# Patient Record
Sex: FEMALE | NOT HISPANIC OR LATINO | Employment: OTHER | URBAN - METROPOLITAN AREA
[De-identification: names, ages, dates, MRNs, and addresses within clinical notes are randomized per-mention and may not be internally consistent; named-entity substitution may affect disease eponyms.]

---

## 2017-02-27 ENCOUNTER — TRANSFERRED RECORDS (OUTPATIENT)
Dept: HEALTH INFORMATION MANAGEMENT | Facility: CLINIC | Age: 67
End: 2017-02-27

## 2017-12-27 NOTE — TELEPHONE ENCOUNTER
APPT INFO    Date /Time: 1/26/18 3:50PM    Reason for Appt: COPD, referred by Dr Peter Wells, pt has outside pulm recs    Ref Provider/Clinic: RUSSELL KERR, [facility]    Are there internal records? Yes/No?  IF YES, list clinic names: No internal recs    Are there outside records? Yes/No? Yes    Patient Contact (Y/N) & Call Details: Yes call was connected from Referrals team (Doni) spoke with pt and mailing out STEFFANY forms to pt   Action: Mailing out STEFFANY forms      OUTSIDE RECORDS CHECKLIST     CLINIC NAME COMMENTS REC (x) IMG (x)   [facility] Russell KERR (referring) / Keerthi KERR ([facility])    Faxed cover sheets to re. recs      [facility] Plasencia MD       [facility] Antonio TAY

## 2017-12-29 NOTE — TELEPHONE ENCOUNTER
Records Received From: [facility]     Date/Exam/Location  (specify location if different)   Office Notes:     [facility] notes: 5/24/17  [facility] notes: 8/11/17, 5/8/17, 9/9/16, 5/18/16   Radiology Reports: - CT chest 6/13/11-[facility]   - xray chest 2/27/17- [facility] radiology  - CT chest pulmonary angio 3/31/16- [facility] radiology    PFT: 4/5/16- [place]

## 2017-12-29 NOTE — TELEPHONE ENCOUNTER
Records Received From: [facility]     Date/Exam/Location  (specify location if different)   Office Notes: 2/27/17, 1/24/17, 4/18/16, 3/30/16, 3/22/16, 10/12/15, 2014, 2013, 2012

## 2018-01-18 NOTE — TELEPHONE ENCOUNTER
ACTION    What did you do? Faxed cover sheets to doc. recs from  Delmis Plasencia MD   [facility] KERR [facility] Dave GREGORY  [facility] Antonio TAY

## 2018-01-19 NOTE — TELEPHONE ENCOUNTER
Note:   Per fax from [employer], no records for patient. Also noted they are not the office of Dr. Yessica Plasencia.

## 2018-01-22 DIAGNOSIS — J44.9 COPD (CHRONIC OBSTRUCTIVE PULMONARY DISEASE) (H): Primary | ICD-10-CM

## 2018-01-25 NOTE — TELEPHONE ENCOUNTER
Phone Call:    Who did you talk to? (or) Who did you call? Called PT    Call Detail/Action: Called and LM for pt - STEFFANY form for [facility] form was not filled completely.

## 2018-01-25 NOTE — TELEPHONE ENCOUNTER
ACTION    What did you do? Faxed cover sheet to [facility] to obtain outside recs  Faxed cover sheet to [facility] to doc. zoraidas

## 2018-01-26 ENCOUNTER — RADIANT APPOINTMENT (OUTPATIENT)
Dept: GENERAL RADIOLOGY | Facility: CLINIC | Age: 68
End: 2018-01-26
Payer: COMMERCIAL

## 2018-01-26 ENCOUNTER — PRE VISIT (OUTPATIENT)
Dept: PULMONOLOGY | Facility: CLINIC | Age: 68
End: 2018-01-26

## 2018-01-26 ENCOUNTER — OFFICE VISIT (OUTPATIENT)
Dept: PULMONOLOGY | Facility: CLINIC | Age: 68
End: 2018-01-26
Attending: INTERNAL MEDICINE
Payer: MEDICARE

## 2018-01-26 VITALS
SYSTOLIC BLOOD PRESSURE: 128 MMHG | RESPIRATION RATE: 16 BRPM | OXYGEN SATURATION: 97 % | DIASTOLIC BLOOD PRESSURE: 83 MMHG | HEART RATE: 78 BPM

## 2018-01-26 DIAGNOSIS — J44.9 COPD (CHRONIC OBSTRUCTIVE PULMONARY DISEASE) (H): ICD-10-CM

## 2018-01-26 DIAGNOSIS — J44.9 CHRONIC OBSTRUCTIVE PULMONARY DISEASE, UNSPECIFIED COPD TYPE (H): Primary | ICD-10-CM

## 2018-01-26 PROCEDURE — G0463 HOSPITAL OUTPT CLINIC VISIT: HCPCS | Mod: ZF

## 2018-01-26 RX ORDER — PREDNISONE 20 MG/1
40 TABLET ORAL DAILY
Qty: 10 TABLET | Refills: 1 | Status: SHIPPED | OUTPATIENT
Start: 2018-01-26 | End: 2018-01-31

## 2018-01-26 RX ORDER — ATORVASTATIN CALCIUM 40 MG/1
40 TABLET, FILM COATED ORAL
COMMUNITY
Start: 2016-07-13 | End: 2024-02-16

## 2018-01-26 RX ORDER — MAGNESIUM GLUCONATE 30 MG(550)
TABLET ORAL
COMMUNITY

## 2018-01-26 RX ORDER — PANTOPRAZOLE SODIUM 40 MG/1
40 TABLET, DELAYED RELEASE ORAL
COMMUNITY
End: 2024-02-16

## 2018-01-26 RX ORDER — PRAMIPEXOLE DIHYDROCHLORIDE 0.12 MG/1
0.12 TABLET ORAL
COMMUNITY

## 2018-01-26 RX ORDER — DOXYCYCLINE HYCLATE 50 MG/1
100 CAPSULE ORAL 2 TIMES DAILY
Qty: 28 CAPSULE | Refills: 1 | Status: SHIPPED | OUTPATIENT
Start: 2018-01-26 | End: 2018-02-02

## 2018-01-26 RX ORDER — FLUTICASONE PROPIONATE 220 UG/1
2 AEROSOL, METERED RESPIRATORY (INHALATION)
COMMUNITY
Start: 2017-08-11 | End: 2024-02-16

## 2018-01-26 RX ORDER — LORATADINE 10 MG/1
10 TABLET ORAL
COMMUNITY

## 2018-01-26 RX ORDER — FLUTICASONE PROPIONATE 50 MCG
2 SPRAY, SUSPENSION (ML) NASAL
COMMUNITY
Start: 2016-09-09 | End: 2024-03-19

## 2018-01-26 RX ORDER — ALBUTEROL SULFATE 90 UG/1
2 AEROSOL, METERED RESPIRATORY (INHALATION)
COMMUNITY
Start: 2017-08-11

## 2018-01-26 RX ORDER — ZOLPIDEM TARTRATE 5 MG/1
5 TABLET ORAL
COMMUNITY

## 2018-01-26 ASSESSMENT — PAIN SCALES - GENERAL: PAINLEVEL: NO PAIN (0)

## 2018-01-26 NOTE — NURSING NOTE
Chief Complaint   Patient presents with     Consult     Patient is fozia seen for consultation of COPD      Sugey Busby CMA at 3:28 PM on 1/26/2018

## 2018-01-26 NOTE — LETTER
1/26/2018       RE: Lubna Bruce  [address]  [place] [address]     Dear Colleague,    Thank you for referring your patient, Lubna Bruce, to the [facility] at [facility]. Please see a copy of my visit note below.    Pulmonary Clinic Initial Visit Note    CC: Shortness of breath      HPI: 67 year old female with hx of NSCLC s/p LLL lobectomy (2005), PFO s/p closure (2005), KIMBERLI (not on CPAP), GERD and mild COPD who is seen as a new patient for shortness of breath.    She previously has been seen by pulmonary at [facility]  (Dr. Pendleton),  last seen August 2017.  She carries a known diagnosis of mild COPD with a FEV1 of 1.91 L (79% of predicted in May 2017) with a normal DLCO (April 2016).  She has a history of non-small cell lung cancer status post left lower lobectomy in 2015, for which she is currently in remission.  At that time of the dx, she was found to have a PFO and she is status post closure in 2005.  She also has a long history of GERD for which she is undergone EGD and manometry testing which showed significant acid reflux symptoms, she is currently on a PPI.  She has been followed by sleep at [facility] for a number of years, but has not been seen recently.  A sleep study from 2013 showed an AHI of 9.0 events per hour consistent with mild obstructive sleep apnea.  She is currently not using CPAP.    Her COPD has been maintained on Flovent and albuterol.  Previously she had been on a LAMA but has not been able to afford it and thus has not used it for unclear amount of time. There is been some concern of vocal cord dysfunction in the past and she had been referred to speech therapy.  She has been seen by ENT twice most recently last summer, with both instances having normal exams.      She notes having 1 or 2 exacerbations per year typically preceded by allergy symptoms.  She typically starts with a sore throat  and will be sick for at least 2-3 weeks.  With previous episode she has received prednisone and antibiotics which she is unclear whether or not have helped.  She has been seen by an allergist recently and there were no clear-cut precipitants to her exacerbations.  She describes her exacerbations as having upper airway wheezing that can be difficult for her to breathe.  She typically does not have a cough.     On evaluation today, she feels quite well.  She denies any recent wheezing or exacerbations.  She notes when she is feeling well she uses her albuterol typically once a week.  She continues to be quite active and goes to the [facility] 3-5 times per week doing water aerobics.  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     Her pulmonary function today reveals mild obstructive ventilatory defect with normal lung volumes and normal diffusion (very similar to her previous pulmonary function tests).  Chest x-ray today reveals no acute pulmonary infiltrates or interstitial changes.       PMH:  COPD: Mild airflow obstruction  Non-small cell lung cancer status post left lower lobectomy 2005  GERD  PFO status post closure 2005   Obstructive sleep apnea (not on CPAP)    Allergies:  No known drug allergy    Social History:  She has never smoked.  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     Social History Main Topics     Smoking status: Not on file     Smokeless tobacco: Not on file     Alcohol use Not on file     Drug use: Not on file     Sexual activity: Not on file     Other Topics Concern     Not on file     Social History Narrative      Medications:  Aspirin   Atorvastatin   Flonase  Flovent 2 puffs twice a day  Loratadine  Magnesium  Protonix   Mirapex  Ambien    Family History:  Mother passed away at 74 years old.  She is a history of alcoholism and emphysema  Father  at 51 years old.  He had a history of alcoholism and coronary artery disease    ROS: Complete 10 point ROS negative unless mentioned in HPI    Physical Exam:  There were no vitals taken for this visit.    General: Sitting in the chair in NAD  HEENT: anicteric, moist mucosa  Neck: no palpable lymphadenopathy, no JVD noted  Chest: CTAB, no wheezing, no upper airway wheeze noted with forced expiration.  No stridor.   Cardiac: RRR no murmurs  Abdomen: Soft, flat, non tender, active BS  Extremities: Trace LE Edema  Neuro: A&Ox3, no focal defecits  Skin: no rash noted    Labs and Radiology:    Chest x-ray (2018): By my review no acute pathology noted, full radiology report to follow    Echocardiogram (2016): LV function is normal with an estimated ejection fraction of 60% with normal regional wall motion.  Amplatzer device in place with no evidence of shunt.  Mild elevated pulmonary pressure estimated at 25.1 mmHg.  This is unchanged from previous echo noted in 2011.    No new laboratory data    PFT's:        Assessment and Plan:  Lubna Bruce is a 67 year old female with hx of NSCLC s/p LLL lobectomy (), PFO s/p closure (), KIMBERLI (not on CPAP), GERD and mild COPD who is seen as a new patient for shortness of breath.    Shortness of breath: Pulmonary function today shows mild COPD.  She has been maintained on Flovent and albuterol with relatively good success.  Based on her previous pulmonary visits at [facility] there has been concern for vocal cord dysfunction.  However she has been evaluated by ENT at least twice with normal exams and therefore has not been referred to speech therapy.  The symptoms he describes certainly could be explained by  vocal cord dysfunction as opposed to her mild obstructive ventilatory defect but she seems reluctant to move forward with speech therapy at this.  It seems reasonable this time to keep her on her current inhaler regimen and prescribe her home action plan.  Probably most importantly she would benefit from seeing her COPD respiratory therapist group who can be available when she has questions.   -Continue Flovent and albuterol  -Prescribed home action plan (prednisone 40 mg ×5 day and doxycycline 200 mg ×7 days)  -Referral to COPD respiratory therapy  -Pulmonary rehab could be discussed in the future although cost is an issue and is deferred at this time.  She remains as active as she has now most likely would not be needed    Return to clinic in 1 year.    Seen and staffed with Dr. Walker.    Attending statement:    The patient was seen and examined by me with the pulmonary fellow, Dr Faustin.  The case was discussed at length.  Vitals, lab results and imaging from our visit were reviewed.  The note written by Dr Faustin above reflects our joint assessment and plan.    Donny Walker MD    Again, thank you for allowing me to participate in the care of your patient.      Sincerely,    Kennedy Faustin MD

## 2018-01-26 NOTE — MR AVS SNAPSHOT
After Visit Summary   1/26/2018    Lubna Bruce    MRN: [id number]           Patient Information     Date Of Birth          1950        Visit Information        Provider Department      1/26/2018 3:50 PM Kennedy Faustin MD [facility]        Today's Diagnoses     Chronic obstructive pulmonary disease, unspecified COPD type (H)    -  1      Care Instructions    1. Will have a home action plan for you in case you need it for exacerbations   2. COPD RT referral   3. Continue with your current inhalers    Return to clinic in 1 year          Follow-ups after your visit        Additional Services     RT Chronic Pulmonary Disease Specialist Consult                 Follow-up notes from your care team     Return in about 1 year (around 1/26/2019).      Who to contact     If you have questions or need follow up information about today's clinic visit or your schedule please contact [facility] directly at [phone].  Normal or non-critical lab and imaging results will be communicated to you by Lamieccohart, letter or phone within 4 business days after the clinic has received the results. If you do not hear from us within 7 days, please contact the clinic through Lamieccohart or phone. If you have a critical or abnormal lab result, we will notify you by phone as soon as possible.  Submit refill requests through [facility] or call your pharmacy and they will forward the refill request to us. Please allow 3 business days for your refill to be completed.          Additional Information About Your Visit        Lamieccohart Information     [employer] gives you secure access to your electronic health record. If you see a primary care provider, you can also send messages to your care team and make appointments. If you have questions, please call your primary care clinic.  If you do not have a primary care provider, please call [phone] and they will  assist you.        Care EveryWhere ID     This is your Care EveryWhere ID. This could be used by other organizations to access your [place] medical records  [id number]        Your Vitals Were     Pulse Respirations Pulse Oximetry             78 16 97%          Blood Pressure from Last 3 Encounters:   01/26/18 128/83    Weight from Last 3 Encounters:   No data found for Wt              We Performed the Following     RT Chronic Pulmonary Disease Specialist Consult          Today's Medication Changes          These changes are accurate as of 1/26/18 11:59 PM.  If you have any questions, ask your nurse or doctor.               Start taking these medicines.        Dose/Directions    doxycycline 50 MG capsule   Commonly known as:  VIBRAMYCIN   Used for:  Chronic obstructive pulmonary disease, unspecified COPD type (H)   Started by:  Kennedy Faustin MD        Dose:  100 mg   Take 2 capsules (100 mg) by mouth 2 times daily for 7 days   Quantity:  28 capsule   Refills:  1       predniSONE 20 MG tablet   Commonly known as:  DELTASONE   Used for:  Chronic obstructive pulmonary disease, unspecified COPD type (H)   Started by:  Kennedy Faustin MD        Dose:  40 mg   Take 2 tablets (40 mg) by mouth daily for 5 days   Quantity:  10 tablet   Refills:  1            Where to get your medicines      These medications were sent [facility] [address] [place] [facility]  [address] [facility] [address]     Phone:  [phone]     doxycycline 50 MG capsule    predniSONE 20 MG tablet                Primary Care Provider Office Phone # Fax #    Peter Wells [phone]       [address] [place]  [facility] [address]        Equal Access to Services     [facility] Center[facility]AR : Hadii jacek low hadasho Sojayson, waaxda luqadaha, qaybta kaalmada travon, noel maldonado. So [facility] [phone].    ATENCIÓN: Si habla español, tiene a abdi disposición servicios  chay de asistencia lingüística. Lissa knight [phone].    We comply with applicable federal civil rights laws and [place] laws. We do not discriminate on the basis of race, color, national origin, age, disability, sex, sexual orientation, or gender identity.            Thank you!     Thank you for choosing [facility]G SCIENCE [facility] HE[facility]  for your care. Our goal is always to provide you with excellent care. Hearing back from our patients is one way we can continue to improve our services. Please take a few minutes to complete the written survey that you may receive in the mail after your visit with us. Thank you!             Your Updated Medication List - Protect others around you: Learn how to safely use, store and throw away your medicines at www.disposemymeds.org.          This list is accurate as of 1/26/18 11:59 PM.  Always use your most recent med list.                   Brand Name Dispense Instructions for use Diagnosis    albuterol 108 (90 BASE) MCG/ACT Inhaler    PROAIR HFA/PROVENTIL HFA/VENTOLIN HFA     Inhale 2 puffs into the lungs        aspirin 81 MG tablet      Take 81 mg by mouth        atorvastatin 40 MG tablet    LIPITOR     Take 40 mg by mouth        doxycycline 50 MG capsule    VIBRAMYCIN    28 capsule    Take 2 capsules (100 mg) by mouth 2 times daily for 7 days    Chronic obstructive pulmonary disease, unspecified COPD type (H)       fluticasone 220 MCG/ACT Inhaler    FLOVENT HFA     Inhale 2 puffs into the lungs        fluticasone 50 MCG/ACT spray    FLONASE     2 sprays        loratadine 10 MG tablet    CLARITIN     Take 10 mg by mouth        pantoprazole 40 MG EC tablet    PROTONIX     Take 40 mg by mouth        Potassium Gluconate 595 (99 K) MG Tabs           pramipexole 0.125 MG tablet    MIRAPEX     Take 0.125 mg by mouth        predniSONE 20 MG tablet    DELTASONE    10 tablet    Take 2 tablets (40 mg) by mouth daily for 5 days    Chronic obstructive pulmonary disease,  unspecified COPD type (H)       zolpidem 5 MG tablet    AMBIEN     Take 5 mg by mouth

## 2018-01-26 NOTE — PATIENT INSTRUCTIONS
1. Will have a home action plan for you in case you need it for exacerbations   2. COPD RT referral   3. Continue with your current inhalers    Return to clinic in 1 year

## 2018-01-26 NOTE — PROGRESS NOTES
Pulmonary Clinic Initial Visit Note    CC: Shortness of breath      HPI: 67 year old female with hx of NSCLC s/p LLL lobectomy (2005), PFO s/p closure (2005), KIMBERLI (not on CPAP), GERD and mild COPD who is seen as a new patient for shortness of breath.    She previously has been seen by pulmonary at [facility]  (Dr. Pendleton),  last seen August 2017.  She carries a known diagnosis of mild COPD with a FEV1 of 1.91 L (79% of predicted in May 2017) with a normal DLCO (April 2016).  She has a history of non-small cell lung cancer status post left lower lobectomy in 2015, for which she is currently in remission.  At that time of the dx, she was found to have a PFO and she is status post closure in 2005.  She also has a long history of GERD for which she is undergone EGD and manometry testing which showed significant acid reflux symptoms, she is currently on a PPI.  She has been followed by sleep at [facility] for a number of years, but has not been seen recently.  A sleep study from 2013 showed an AHI of 9.0 events per hour consistent with mild obstructive sleep apnea.  She is currently not using CPAP.    Her COPD has been maintained on Flovent and albuterol.  Previously she had been on a LAMA but has not been able to afford it and thus has not used it for unclear amount of time. There is been some concern of vocal cord dysfunction in the past and she had been referred to speech therapy.  She has been seen by ENT twice most recently last summer, with both instances having normal exams.      She notes having 1 or 2 exacerbations per year typically preceded by allergy symptoms.  She typically starts with a sore throat and will be sick for at least 2-3 weeks.  With previous episode she has received prednisone and antibiotics which she is unclear whether or not have helped.  She has been seen by an allergist recently and there were no clear-cut precipitants to her exacerbations.  She describes her exacerbations as having  upper airway wheezing that can be difficult for her to breathe.  She typically does not have a cough.     On evaluation today, she feels quite well.  She denies any recent wheezing or exacerbations.  She notes when she is feeling well she uses her albuterol typically once a week.  She continues to be quite active and goes to the [facility] 3-5 times per week doing water aerobics.  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     Her pulmonary function today reveals mild obstructive ventilatory defect with normal lung volumes and normal diffusion (very similar to her previous pulmonary function tests).  Chest x-ray today reveals no acute pulmonary infiltrates or interstitial changes.       PMH:  COPD: Mild airflow obstruction  Non-small cell lung cancer status post left lower lobectomy   GERD  PFO status post closure 2005   Obstructive sleep apnea (not on CPAP)    Allergies:  No known drug allergy    Social History:  She has never smoked.  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     Social History Main Topics     Smoking status: Not on file     Smokeless tobacco: Not on file     Alcohol use Not on file     Drug use: Not on file     Sexual activity: Not on file     Other Topics Concern     Not on file     Social History Narrative     Medications:  Aspirin   Atorvastatin   Flonase  Flovent 2 puffs twice a day  Loratadine  Magnesium  Protonix   Mirapex  Ambien    Family History:  Mother passed away at 74 years old.  She is a history of alcoholism and emphysema  Father  at 51 years old.  He had a history of alcoholism and coronary artery  disease    ROS: Complete 10 point ROS negative unless mentioned in HPI    Physical Exam:  There were no vitals taken for this visit.    General: Sitting in the chair in NAD  HEENT: anicteric, moist mucosa  Neck: no palpable lymphadenopathy, no JVD noted  Chest: CTAB, no wheezing, no upper airway wheeze noted with forced expiration.  No stridor.   Cardiac: RRR no murmurs  Abdomen: Soft, flat, non tender, active BS  Extremities: Trace LE Edema  Neuro: A&Ox3, no focal defecits  Skin: no rash noted    Labs and Radiology:    Chest x-ray (1/26/2018): By my review no acute pathology noted, full radiology report to follow    Echocardiogram (4/1/2016): LV function is normal with an estimated ejection fraction of 60% with normal regional wall motion.  Amplatzer device in place with no evidence of shunt.  Mild elevated pulmonary pressure estimated at 25.1 mmHg.  This is unchanged from previous echo noted in November 2011.    No new laboratory data    PFT's:        Assessment and Plan:  Lubna Bruce is a 67 year old female with hx of NSCLC s/p LLL lobectomy (2005), PFO s/p closure (2005), KIMBERLI (not on CPAP), GERD and mild COPD who is seen as a new patient for shortness of breath.    Shortness of breath: Pulmonary function today shows mild COPD.  She has been maintained on Flovent and albuterol with relatively good success.  Based on her previous pulmonary visits at [facility] there has been concern for vocal cord dysfunction.  However she has been evaluated by ENT at least twice with normal exams and therefore has not been referred to speech therapy.  The symptoms he describes certainly could be explained by vocal cord dysfunction as opposed to her mild obstructive ventilatory defect but she seems reluctant to move forward with speech therapy at this.  It seems reasonable this time to keep her on her current inhaler regimen and prescribe her home action plan.  Probably most importantly she would benefit from seeing  her COPD respiratory therapist group who can be available when she has questions.   -Continue Flovent and albuterol  -Prescribed home action plan (prednisone 40 mg ×5 day and doxycycline 200 mg ×7 days)  -Referral to COPD respiratory therapy  -Pulmonary rehab could be discussed in the future although cost is an issue and is deferred at this time.  She remains as active as she has now most likely would not be needed    Return to clinic in 1 year.    Seen and staffed with Dr. Walker.    Kennedy Faustin MD  Pulmonary and Critical Care Fellow  [phone]     Attending statement:    The patient was seen and examined by me with the pulmonary fellow, Dr Faustin.  The case was discussed at length.  Vitals, lab results and imaging from our visit were reviewed.  The note written by Dr Faustin above reflects our joint assessment and plan.    Donny Walker MD

## 2018-02-01 LAB
DLCOUNC-%PRED-PRE: 85 %
DLCOUNC-PRE: 17.93 ML/MIN/MMHG
DLCOUNC-PRED: 21 ML/MIN/MMHG
ERV-%PRED-PRE: 47 %
ERV-PRE: 0.25 L
ERV-PRED: 0.53 L
EXPTIME-PRE: 8.2 SEC
FEF2575-%PRED-POST: 62 %
FEF2575-%PRED-PRE: 61 %
FEF2575-POST: 1.21 L/SEC
FEF2575-PRE: 1.18 L/SEC
FEF2575-PRED: 1.94 L/SEC
FEFMAX-%PRED-PRE: 70 %
FEFMAX-PRE: 4.12 L/SEC
FEFMAX-PRED: 5.85 L/SEC
FEV1-%PRED-PRE: 87 %
FEV1-PRE: 2.02 L
FEV1FEV6-PRE: 63 %
FEV1FEV6-PRED: 79 %
FEV1FVC-PRE: 62 %
FEV1FVC-PRED: 78 %
FEV1SVC-PRE: 62 %
FEV1SVC-PRED: 72 %
FIFMAX-PRE: 3.7 L/SEC
FRCPLETH-%PRED-PRE: 101 %
FRCPLETH-PRE: 2.82 L
FRCPLETH-PRED: 2.77 L
FVC-%PRED-PRE: 108 %
FVC-PRE: 3.24 L
FVC-PRED: 2.97 L
IC-%PRED-PRE: 111 %
IC-PRE: 2.98 L
IC-PRED: 2.67 L
RVPLETH-%PRED-PRE: 122 %
RVPLETH-PRE: 2.57 L
RVPLETH-PRED: 2.1 L
TLCPLETH-%PRED-PRE: 113 %
TLCPLETH-PRE: 5.8 L
TLCPLETH-PRED: 5.11 L
VA-%PRED-PRE: 94 %
VA-PRE: 4.95 L
VC-%PRED-PRE: 101 %
VC-PRE: 3.23 L
VC-PRED: 3.2 L

## 2018-04-23 ENCOUNTER — THERAPY VISIT (OUTPATIENT)
Dept: PHYSICAL THERAPY | Facility: CLINIC | Age: 68
End: 2018-04-23
Payer: COMMERCIAL

## 2018-04-23 DIAGNOSIS — G89.29 CHRONIC RIGHT SHOULDER PAIN: Primary | ICD-10-CM

## 2018-04-23 DIAGNOSIS — M25.511 CHRONIC RIGHT SHOULDER PAIN: Primary | ICD-10-CM

## 2018-04-23 PROCEDURE — 97161 PT EVAL LOW COMPLEX 20 MIN: CPT | Mod: GP | Performed by: PHYSICAL THERAPIST

## 2018-04-23 PROCEDURE — 97110 THERAPEUTIC EXERCISES: CPT | Mod: GP | Performed by: PHYSICAL THERAPIST

## 2018-04-23 NOTE — PROGRESS NOTES
[facility] Initial Evaluation  Subjective:  Patient is a 67 year old female presenting with rehab right shoulder hpi.   Lubna Bruce is a 67 year old female with a right shoulder condition.  Condition occurred with:  Unknown cause.  Condition occurred: for unknown reasons.  This is a chronic condition  Pt reports noticing R upper arm pain about eight months ago without known cause.   The pain has specifically been more noticeable since Feb 2018 when performing water exercises in the pool at the . Saw MD in April 2018 who recommended PT. .    Patient reports pain:  Upper arm.    Pain is described as aching and is constant and reported as 8/10.  Associated symptoms:  Loss of motion/stiffness and loss of strength. Pain is worse in the A.M..  Symptoms are exacerbated by certain positions and using arm at shoulder level and relieved by rest.  Since onset symptoms are gradually worsening.        General health as reported by patient is good.  Pertinent medical history includes:  Other (COPD).  Medical allergies: yes (CT dye, sensitive to morphine).  Other surgeries include:  Other and cancer surgery (tonsillectomy, tubligation, L hand, hysterectomy and prolapsed balder repair, septal defect repair, lower left lobectomy for cancer, basil cell remvoal).  Current medications:  Other (cholesterol, rsless leg, antihisamine, flovent, albuteraol, potassium fo rleg cramps, acid reflux).  Current occupation is [employer] .    Primary job tasks include:  Repetitive tasks and other (computer work).        Red flags:  None as reported by the patient.                        Objective:  Standing Alignment:    Cervical/Thoracic:  Forward head, cervical lordosis decreased and thoracic kyphosis increased                                         Shoulder Evaluation:  ROM:  AROM:    Flexion:  Left:  WNL    Right:  103    Abduction:  Left: WNL   Right:  82                Flexion/External  Rotation:  Left:  T3    Right:  T2  Extension/Internal Rotation:  Left:  T6    Right:  Central belt liine          Strength:    Flexion: Left:5/5   Pain:    Right: 5/5     Pain:     Abduction:  Left: 5/5  Pain:    Right: 5-/5     Pain:    Internal Rotation:  Left:5/5     Pain:    Right: 5/5     Pain:  External Rotation:   Left:5/5     Pain:   Right:4+/5     Pain:        Elbow Flexion:  Left:5/5     Pain:    Right:5/5     Pain:  Elbow Extension:  Left:5/5     Pain:    Right:5/5     Pain:                                           Elsie Cervical Evaluation    Posture:  Sitting: poor              Test Movements:      RET: During: no effect  Mechanical Response: no effect  Repeat RET: During: no effect  Mechanical Response: IncROM  RET EXT: During: no effect  After: no effect  Mechanical Response: no effect  Repeat RET EXT: During: no effect  After: no effect  Mechanical Response: IncROM                                                                   ROS    Assessment/Plan:    Patient is a 67 year old female with right side shoulder complaints.    Patient has the following significant findings with corresponding treatment plan.                Diagnosis 1:  R shoulder pain, R cervical above elbow derangement  Pain -  manual therapy, self management, education, directional preference exercise and home program  Decreased ROM/flexibility - manual therapy, therapeutic exercise, therapeutic activity and home program  Decreased joint mobility - manual therapy, therapeutic exercise, therapeutic activity and home program  Decreased strength - therapeutic exercise, therapeutic activities and home program  Inflammation - self management/home program  Decreased function - therapeutic activities and home program  Impaired posture - neuro re-education, therapeutic activities and home program    Therapy Evaluation Codes:   1) History comprised of:   Personal factors that impact the plan of care:      None.    Comorbidity factors  that impact the plan of care are:      None.     Medications impacting care: None.  2) Examination of Body Systems comprised of:   Body structures and functions that impact the plan of care:      Cervical spine and Shoulder.   Activity limitations that impact the plan of care are:      Reaching.  3) Clinical presentation characteristics are:   Evolving/Changing.  4) Decision-Making    Moderate complexity using standardized patient assessment instrument and/or measureable assessment of functional outcome.  Cumulative Therapy Evaluation is: Low complexity.    Previous and current functional limitations:  (See Goal Flow Sheet for this information)    Short term and Long term goals: (See Goal Flow Sheet for this information)     Communication ability:  Patient appears to be able to clearly communicate and understand verbal and written communication and follow directions correctly.  Treatment Explanation - The following has been discussed with the patient:   RX ordered/plan of care  Anticipated outcomes  Possible risks and side effects  This patient would benefit from PT intervention to resume normal activities.   Rehab potential is excellent.    Frequency:  2 X week, once daily  Duration:  for 4 weeks  Discharge Plan:  Achieve all LTG.  Independent in home treatment program.  Reach maximal therapeutic benefit.    Please refer to the daily flowsheet for treatment today, total treatment time and time spent performing 1:1 timed codes.

## 2018-04-23 NOTE — LETTER
[facility]  [address]  [facility] [id number] [phone]    2018    Re: Lubna Bruce   :   1950  MRN:  [id number]   REFERRING PHYSICIAN:   Peter Wells    [facility]    Date of Initial Evaluation:  2018  Visits:  Rxs Used: 1  Reason for Referral:  Chronic right shoulder pain    EVALUATION SUMMARY    [facility] Initial Evaluation  Subjective:  Patient is a 67 year old female presenting with rehab right shoulder hpi.   Lubna Bruce is a 67 year old female with a right shoulder condition.  Condition occurred with:  Unknown cause.  Condition occurred: for unknown reasons.  This is a chronic condition  Pt reports noticing R upper arm pain about eight months ago without known cause.   The pain has specifically been more noticeable since 2018 when performing water exercises in the pool at the . Saw MD in 2018 who recommended PT. .    Patient reports pain:  Upper arm.    Pain is described as aching and is constant and reported as 8/10.  Associated symptoms:  Loss of motion/stiffness and loss of strength. Pain is worse in the A.M..  Symptoms are exacerbated by certain positions and using arm at shoulder level and relieved by rest.  Since onset symptoms are gradually worsening.        General health as reported by patient is good.  Pertinent medical history includes:  Other (COPD).  Medical allergies: yes (CT dye, sensitive to morphine).  Other surgeries include:  Other and cancer surgery (tonsillectomy, tubligation, L hand, hysterectomy and prolapsed balder repair, septal defect repair, lower left lobectomy for cancer, basil cell remvoal).  Current medications:  Other (cholesterol, rsless leg, antihisamine, flovent, albuteraol, potassium fo rleg cramps, acid reflux).  Current occupation is [employer] .     Primary job tasks include:  Repetitive tasks and other (computer work).  Red flags:  None as reported by the patient.  Objective:  Standing Alignment:    Cervical/Thoracic:  Forward head, cervical lordosis decreased and thoracic kyphosis increased  Shoulder Evaluation:  ROM:  AROM:    Flexion:  Left:  WNL    Right:  103  Abduction:  Left: WNL   Right:  82      Flexion/External Rotation:  Left:  T3    Right:  T2  Extension/Internal Rotation:  Left:  T6    Right:  Central belt liine    Strength:    Flexion: Left:5/5   Pain:    Right: 5/5     Pain:   Abduction:  Left: 5/5  Pain:    Right: 5-/5     Pain:  Internal Rotation:  Left:5/5     Pain:    Right: 5/5     Pain:  External Rotation:   Left:5/5     Pain:   Right:4+/5     Pain:    Elbow Flexion:  Left:5/5     Pain:    Right:5/5     Pain:  Elbow Extension:  Left:5/5     Pain:    Right:5/5     Pain:  Elsie Cervical Evaluation  Posture:  Sitting: poor  Test Movements:  RET: During: no effect  Mechanical Response: no effect  Repeat RET: During: no effect  Mechanical Response: IncROM  RET EXT: During: no effect  After: no effect  Mechanical Response: no effect  Repeat RET EXT: During: no effect  After: no effect  Mechanical Response: IncROM  Assessment/Plan:    Patient is a 67 year old female with right side shoulder complaints.    Patient has the following significant findings with corresponding treatment plan.                Diagnosis 1:  R shoulder pain, R cervical above elbow derangement  Pain -  manual therapy, self management, education, directional preference exercise and home program  Decreased ROM/flexibility - manual therapy, therapeutic exercise, therapeutic activity and home program  Decreased joint mobility - manual therapy, therapeutic exercise, therapeutic activity and home program  Decreased strength - therapeutic exercise, therapeutic activities and home program  Inflammation - self management/home program  Decreased function - therapeutic activities  and home program  Impaired posture - neuro re-education, therapeutic activities and home program    Therapy Evaluation Codes:   1) History comprised of:   Personal factors that impact the plan of care:      None.    Comorbidity factors that impact the plan of care are:      None.     Medications impacting care: None.  2) Examination of Body Systems comprised of:   Body structures and functions that impact the plan of care:      Cervical spine and Shoulder.   Activity limitations that impact the plan of care are:      Reaching.  3) Clinical presentation characteristics are:   Evolving/Changing.  4) Decision-Making    Moderate complexity using standardized patient assessment instrument and/or measureable assessment of functional outcome.  Cumulative Therapy Evaluation is: Low complexity.    Previous and current functional limitations:  (See Goal Flow Sheet for this information)    Short term and Long term goals: (See Goal Flow Sheet for this information)     Communication ability:  Patient appears to be able to clearly communicate and understand verbal and written communication and follow directions correctly.  Treatment Explanation - The following has been discussed with the patient:   RX ordered/plan of care  Anticipated outcomes  Possible risks and side effects  This patient would benefit from PT intervention to resume normal activities.   Rehab potential is excellent.    Frequency:  2 X week, once daily  Duration:  for 4 weeks  Discharge Plan:  Achieve all LTG.  Independent in home treatment program.  Reach maximal therapeutic benefit.      Thank you for your referral.    INQUIRIES  Therapist: Hossein Liu DPT  [facility] PHYSICAL THERAPY  [address] [place] [address]. [address]  [facility] [address]  Phone: [phone]  Fax: [phone]

## 2018-04-23 NOTE — MR AVS SNAPSHOT
After Visit Summary   4/23/2018    Lubna Bruce    MRN: [id number]           Patient Information     Date Of Birth          1950        Visit Information        Provider Department      4/23/2018 8:50 AM Hossein Liu, PT [facility] Physical [facility]        Today's Diagnoses     Chronic right shoulder pain    -  1       Follow-ups after your visit        Your next 10 appointments already scheduled     Apr 26, 2018  5:00 PM CDT   MARGARITA Extremity with Hossein Liu, PT   [facility] Physical Therapy ([facility])    [address]  [facility] [address]   [phone]            May 01, 2018  8:30 AM CDT   MARGARITA Extremity with Hossein Liu PT   [facility] Physical Therapy ([facility])    [address]  [facility] [address]   [phone]            May 04, 2018  8:20 AM CDT   MARGARITA Extremity with Cathie Stoddard, PT   [facility] Physical Therapy ([facility])    [address]  [facility] [address]   [phone]              Who to contact     If you have questions or need follow up information about today's clinic visit or your schedule please contact [facility] directly at [phone].  Normal or non-critical lab and imaging results will be communicated to you by MyChart, letter or phone within 4 business days after the clinic has received the results. If you do not hear from us within 7 days, please contact the clinic through MyChart or phone. If you have a critical or abnormal lab result, we will notify you by phone as soon as possible.  Submit refill requests through [employer] or call your pharmacy and they will forward the refill request to us. Please allow 3 business days for your refill to be completed.          Additional  Information About Your Visit        [employer] Information     [employer] gives you secure access to your electronic health record. If you see a primary care provider, you can also send messages to your care team and make appointments. If you have questions, please call your primary care clinic.  If you do not have a primary care provider, please call [phone] and they will assist you.        Care EveryWhere ID     This is your Care EveryWhere ID. This could be used by other organizations to access your [place] medical records  [id number]         Blood Pressure from Last 3 Encounters:   01/26/18 128/83    Weight from Last 3 Encounters:   No data found for Wt              We Performed the Following     HC PT EVAL, LOW COMPLEXITY     MARGARITA INITIAL EVAL REPORT     THERAPEUTIC EXERCISES        Primary Care Provider Office Phone # Fax #    Peter Wells [phone]       [address] [place]  [facility] [address]        Equal Access to Services     [facility]: Hadii aad ku hadasho Soomaali, waaxda luqadaha, qaybta kaalmada adeegyada, waxay idiin hayaan ademichelle ryan . So [facility] [phone].    ATENCIÓN: Si habla español, tiene a abdi disposición servicios gratuitos de asistencia lingüística. Llame al [phone].    We comply with applicable federal civil rights laws and [place] laws. We do not discriminate on the basis of race, color, national origin, age, disability, sex, sexual orientation, or gender identity.            Thank you!     Thank you for choosing [facility] PHYSICAL THERAPY  for your care. Our goal is always to provide you with excellent care. Hearing back from our patients is one way we can continue to improve our services. Please take a few minutes to complete the written survey that you may receive in the mail after your visit with us. Thank you!             Your Updated Medication List - Protect others around you: Learn  how to safely use, store and throw away your medicines at www.disposemymeds.org.          This list is accurate as of 4/23/18  1:47 PM.  Always use your most recent med list.                   Brand Name Dispense Instructions for use Diagnosis    albuterol 108 (90 Base) MCG/ACT Inhaler    PROAIR HFA/PROVENTIL HFA/VENTOLIN HFA     Inhale 2 puffs into the lungs        aspirin 81 MG tablet      Take 81 mg by mouth        atorvastatin 40 MG tablet    LIPITOR     Take 40 mg by mouth        fluticasone 220 MCG/ACT Inhaler    FLOVENT HFA     Inhale 2 puffs into the lungs        fluticasone 50 MCG/ACT spray    FLONASE     2 sprays        loratadine 10 MG tablet    CLARITIN     Take 10 mg by mouth        pantoprazole 40 MG EC tablet    PROTONIX     Take 40 mg by mouth        Potassium Gluconate 595 (99 K) MG Tabs           pramipexole 0.125 MG tablet    MIRAPEX     Take 0.125 mg by mouth        zolpidem 5 MG tablet    AMBIEN     Take 5 mg by mouth

## 2018-04-26 ENCOUNTER — THERAPY VISIT (OUTPATIENT)
Dept: PHYSICAL THERAPY | Facility: CLINIC | Age: 68
End: 2018-04-26
Payer: COMMERCIAL

## 2018-04-26 DIAGNOSIS — G89.29 CHRONIC RIGHT SHOULDER PAIN: ICD-10-CM

## 2018-04-26 DIAGNOSIS — M25.511 CHRONIC RIGHT SHOULDER PAIN: ICD-10-CM

## 2018-04-26 PROCEDURE — 97140 MANUAL THERAPY 1/> REGIONS: CPT | Mod: GP | Performed by: PHYSICAL THERAPIST

## 2018-04-26 PROCEDURE — 97110 THERAPEUTIC EXERCISES: CPT | Mod: GP | Performed by: PHYSICAL THERAPIST

## 2018-05-01 ENCOUNTER — THERAPY VISIT (OUTPATIENT)
Dept: PHYSICAL THERAPY | Facility: CLINIC | Age: 68
End: 2018-05-01
Payer: COMMERCIAL

## 2018-05-01 DIAGNOSIS — G89.29 CHRONIC RIGHT SHOULDER PAIN: ICD-10-CM

## 2018-05-01 DIAGNOSIS — M25.511 CHRONIC RIGHT SHOULDER PAIN: ICD-10-CM

## 2018-05-01 PROCEDURE — 97110 THERAPEUTIC EXERCISES: CPT | Mod: GP | Performed by: PHYSICAL THERAPIST

## 2018-05-01 NOTE — MR AVS SNAPSHOT
After Visit Summary   5/1/2018    Lubna Bruce    MRN: [id number]           Patient Information     Date Of Birth          1950        Visit Information        Provider Department      5/1/2018 8:30 AM Hossein Liu PT [facility] Physical Therapy        Today's Diagnoses     Chronic right shoulder pain           Follow-ups after your visit        Your next 10 appointments already scheduled     May 08, 2018  8:30 AM CDT   MARGARITA Extremity with Hossien Liu PT   [facility] Physical Therapy ([facility])    [address]  [facility] [address] [phone]              Who to contact     If you have questions or need follow up information about today's clinic visit or your schedule please contact [facility] PHYSICAL [facility] directly at [phone].  Normal or non-critical lab and imaging results will be communicated to you by [employer], letter or phone within 4 business days after the clinic has received the results. If you do not hear from us within 7 days, please contact the clinic through [employer] or phone. If you have a critical or abnormal lab result, we will notify you by phone as soon as possible.  Submit refill requests through [employer] or call your pharmacy and they will forward the refill request to us. Please allow 3 business days for your refill to be completed.          Additional Information About Your Visit        MyChart Information     [employer] gives you secure access to your electronic health record. If you see a primary care provider, you can also send messages to your care team and make appointments. If you have questions, please call your primary care clinic.  If you do not have a primary care provider, please call [phone] and they will assist you.        Care EveryWhere ID     This is your Care EveryWhere ID. This could be used by other  organizations to access your [place] medical records  [id number]         Blood Pressure from Last 3 Encounters:   01/26/18 128/83    Weight from Last 3 Encounters:   No data found for Wt              We Performed the Following     THERAPEUTIC EXERCISES        Primary Care Provider Office Phone # Fax #    Peter Wells [phone]       [address] [place]  [facility] [address]        Equal Access to Services     [facility] : Hadii aad ku hadasho Soomaali, waaxda luqadaha, qaybta kaalmada adeegyada, waxay idiin hayaan adeeg kharash la'aan ah. So [facility] [phone].    ATENCIÓN: Si habla espsandra, tiene a abdi disposición servicios gratuitos de asistencia lingüística. [facility] [phone].    We comply with applicable federal civil rights laws and [place] laws. We do not discriminate on the basis of race, color, national origin, age, disability, sex, sexual orientation, or gender identity.            Thank you!     Thank you for choosing [facility]  for your care. Our goal is always to provide you with excellent care. Hearing back from our patients is one way we can continue to improve our services. Please take a few minutes to complete the written survey that you may receive in the mail after your visit with us. Thank you!             Your Updated Medication List - Protect others around you: Learn how to safely use, store and throw away your medicines at www.disposemymeds.org.          This list is accurate as of 5/1/18  9:04 AM.  Always use your most recent med list.                   Brand Name Dispense Instructions for use Diagnosis    albuterol 108 (90 Base) MCG/ACT Inhaler    PROAIR HFA/PROVENTIL HFA/VENTOLIN HFA     Inhale 2 puffs into the lungs        aspirin 81 MG tablet      Take 81 mg by mouth        atorvastatin 40 MG tablet    LIPITOR     Take 40 mg by mouth        fluticasone 220 MCG/ACT Inhaler    FLOVENT HFA      Inhale 2 puffs into the lungs        fluticasone 50 MCG/ACT spray    FLONASE     2 sprays        loratadine 10 MG tablet    CLARITIN     Take 10 mg by mouth        pantoprazole 40 MG EC tablet    PROTONIX     Take 40 mg by mouth        Potassium Gluconate 595 (99 K) MG Tabs           pramipexole 0.125 MG tablet    MIRAPEX     Take 0.125 mg by mouth        zolpidem 5 MG tablet    AMBIEN     Take 5 mg by mouth

## 2018-05-08 ENCOUNTER — THERAPY VISIT (OUTPATIENT)
Dept: PHYSICAL THERAPY | Facility: CLINIC | Age: 68
End: 2018-05-08
Payer: COMMERCIAL

## 2018-05-08 DIAGNOSIS — G89.29 CHRONIC RIGHT SHOULDER PAIN: ICD-10-CM

## 2018-05-08 DIAGNOSIS — M25.511 CHRONIC RIGHT SHOULDER PAIN: ICD-10-CM

## 2018-05-08 PROCEDURE — 97110 THERAPEUTIC EXERCISES: CPT | Mod: GP | Performed by: PHYSICAL THERAPIST

## 2018-05-08 NOTE — PROGRESS NOTES
Subjective:  HPI                    Objective:  System    Physical Exam    General     ROS    Assessment/Plan:    SUBJECTIVE  Subjective changes as noted by pt:  Performing cervical extension 4x/day.  Performing the wand exercise 2x/day. Reaching behind the back is the most difficult.      Current pain level: 6/10     Changes in function:  None     Adverse reaction to treatment or activity:  None    OBJECTIVE  Changes in objective findings:  Mod loss R shoulder ext/IR with pain.  See physical exam section and/or daily flowsheet for response to repeated movements.             ASSESSMENT  Lubna continues to require intervention to meet STG and LTG's: PT  Patient is progressing as expected.  Response to therapy has shown an improvement in  pain level and ROM   Progress made towards STG/LTG? None    PLAN  Continue current treatment plan until patient demonstrates readiness to progress to higher level exercises.    PTA/ATC plan:  N/A    Please refer to the daily flowsheet for treatment today, total treatment time and time spent performing 1:1 timed codes.

## 2018-05-08 NOTE — MR AVS SNAPSHOT
After Visit Summary   5/8/2018    Lubna Bruce    MRN: [id number]           Patient Information     Date Of Birth          1950        Visit Information        Provider Department      5/8/2018 8:30 AM Hossein Liu PT [facility] Physical Therapy        Today's Diagnoses     Chronic right shoulder pain           Follow-ups after your visit        Your next 10 appointments already scheduled     May 14, 2018  7:30 AM CDT   MARGARITA Extremity with Hossein Liu PT   [facility] Physical Therapy ([facility])    [address]  [facility] [address] [phone]              Who to contact     If you have questions or need follow up information about today's clinic visit or your schedule please contact [facility] PHYSICAL [facility] directly at [phone].  Normal or non-critical lab and imaging results will be communicated to you by Eventiozhart, letter or phone within 4 business days after the clinic has received the results. If you do not hear from us within 7 days, please contact the clinic through Eventiozhart or phone. If you have a critical or abnormal lab result, we will notify you by phone as soon as possible.  Submit refill requests through [employer] or call your pharmacy and they will forward the refill request to us. Please allow 3 business days for your refill to be completed.          Additional Information About Your Visit        MyChart Information     [employer] gives you secure access to your electronic health record. If you see a primary care provider, you can also send messages to your care team and make appointments. If you have questions, please call your primary care clinic.  If you do not have a primary care provider, please call [phone] and they will assist you.        Care EveryWhere ID     This is your Care EveryWhere ID. This could be used by other  organizations to access your [place] medical records  [id number]         Blood Pressure from Last 3 Encounters:   01/26/18 128/83    Weight from Last 3 Encounters:   No data found for Wt              We Performed the Following     THERAPEUTIC EXERCISES        Primary Care Provider Office Phone # Fax #    Peter Wells [phone]       [address] [place]  [facility] [address]        Equal Access to Services     NORAH BHATTI : Hadii aad ku hadasho Soomaali, waaxda luqadaha, qaybta kaalmada adeegyada, waxay idiin hayaan adeeg kharash la'aan ah. So [facility] [phone].    ATENCIÓN: Si habla espsandra, tiene a abdi disposición servicios gratuitos de asistencia lingüística. [facility] [phone].    We comply with applicable federal civil rights laws and [place] laws. We do not discriminate on the basis of race, color, national origin, age, disability, sex, sexual orientation, or gender identity.            Thank you!     Thank you for choosing [place] [facility] PHYSICAL THERAPY  for your care. Our goal is always to provide you with excellent care. Hearing back from our patients is one way we can continue to improve our services. Please take a few minutes to complete the written survey that you may receive in the mail after your visit with us. Thank you!             Your Updated Medication List - Protect others around you: Learn how to safely use, store and throw away your medicines at www.disposemymeds.org.          This list is accurate as of 5/8/18  9:07 AM.  Always use your most recent med list.                   Brand Name Dispense Instructions for use Diagnosis    albuterol 108 (90 Base) MCG/ACT Inhaler    PROAIR HFA/PROVENTIL HFA/VENTOLIN HFA     Inhale 2 puffs into the lungs        aspirin 81 MG tablet      Take 81 mg by mouth        atorvastatin 40 MG tablet    LIPITOR     Take 40 mg by mouth        fluticasone 220 MCG/ACT Inhaler    FLOVENT HFA      Inhale 2 puffs into the lungs        fluticasone 50 MCG/ACT spray    FLONASE     2 sprays        loratadine 10 MG tablet    CLARITIN     Take 10 mg by mouth        pantoprazole 40 MG EC tablet    PROTONIX     Take 40 mg by mouth        Potassium Gluconate 595 (99 K) MG Tabs           pramipexole 0.125 MG tablet    MIRAPEX     Take 0.125 mg by mouth        zolpidem 5 MG tablet    AMBIEN     Take 5 mg by mouth

## 2018-05-14 ENCOUNTER — THERAPY VISIT (OUTPATIENT)
Dept: PHYSICAL THERAPY | Facility: CLINIC | Age: 68
End: 2018-05-14
Payer: COMMERCIAL

## 2018-05-14 DIAGNOSIS — M25.511 CHRONIC RIGHT SHOULDER PAIN: ICD-10-CM

## 2018-05-14 DIAGNOSIS — G89.29 CHRONIC RIGHT SHOULDER PAIN: ICD-10-CM

## 2018-05-14 PROCEDURE — 97140 MANUAL THERAPY 1/> REGIONS: CPT | Mod: GP | Performed by: PHYSICAL THERAPIST

## 2018-05-14 PROCEDURE — 97110 THERAPEUTIC EXERCISES: CPT | Mod: GP | Performed by: PHYSICAL THERAPIST

## 2018-05-14 NOTE — PROGRESS NOTES
Subjective:  HPI                    Objective:  System    Physical Exam    General     ROS    Assessment/Plan:    SUBJECTIVE  Subjective changes as noted by pt:  Still trouble reaching up behind her back. Pain when lying on R side, but feels the sidelying posterior capsule stretch has overall been helpful since last session last week.      Current pain level: 3/10     Changes in function:  Yes (See Goal flowsheet attached for changes in current functional level)     Adverse reaction to treatment or activity:  None    OBJECTIVE  Changes in objective findings:  Yes, min loss R shoulder ext/IR.          ASSESSMENT  Lubna continues to require intervention to meet STG and LTG's: PT  Patient is progressing as expected.  Response to therapy has shown an improvement in  pain level, ROM  and function  Progress made towards STG/LTG?  Yes (See Goal flowsheet attached for updates on achievement of STG and LTG)    PLAN  Continue current treatment plan until patient demonstrates readiness to progress to higher level exercises.    PTA/ATC plan:  N/A    Please refer to the daily flowsheet for treatment today, total treatment time and time spent performing 1:1 timed codes.

## 2018-05-14 NOTE — MR AVS SNAPSHOT
After Visit Summary   5/14/2018    Lubna Bruce    MRN: [id number]           Patient Information     Date Of Birth          1950        Visit Information        Provider Department      5/14/2018 7:30 AM [facility]        Today's Diagnoses     Chronic right shoulder pain           Follow-ups after your visit        Who to contact     If you have questions or need follow up information about today's clinic visit or your schedule please contact [facility] directly at [phone].  Normal or non-critical lab and imaging results will be communicated to you by Morning Techart, letter or phone within 4 business days after the clinic has received the results. If you do not hear from us within 7 days, please contact the clinic through Morning Techart or phone. If you have a critical or abnormal lab result, we will notify you by phone as soon as possible.  Submit refill requests through [employer] or call your pharmacy and they will forward the refill request to us. Please allow 3 business days for your refill to be completed.          Additional Information About Your Visit        MyChart Information     [employer] gives you secure access to your electronic health record. If you see a primary care provider, you can also send messages to your care team and make appointments. If you have questions, please call your primary care clinic.  If you do not have a primary care provider, please call [phone] and they will assist you.        Care EveryWhere ID     This is your Care EveryWhere ID. This could be used by other organizations to access your [place] medical records  [id number]         Blood Pressure from Last 3 Encounters:   01/26/18 128/83    Weight from Last 3 Encounters:   No data found for Wt              We Performed the Following     MANUAL THER TECH,1+REGIONS,EA 15 MIN      THERAPEUTIC EXERCISES        Primary Care Provider Office Phone # Fax #    Peter Wells [phone]       [address]   [place] [facility] [address]        Equal Access to Services     JEANCARLOS BHATTI : Hadii jacek ku hadtreo Sodevenali, waaxda luqadaha, qaybta kaalmada ademargot, noel panchal laRiannajeyson maldonado. So [facility] [phone].    ATENCIÓN: Si habla español, tiene a abdi disposición servicios gratuitos de asistencia lingüística. [facility] [phone].    We comply with applicable federal civil rights laws and [place] laws. We do not discriminate on the basis of race, color, national origin, age, disability, sex, sexual orientation, or gender identity.            Thank you!     Thank you for choosing [place] [facility] PHYSICAL THERAPY  for your care. Our goal is always to provide you with excellent care. Hearing back from our patients is one way we can continue to improve our services. Please take a few minutes to complete the written survey that you may receive in the mail after your visit with us. Thank you!             Your Updated Medication List - Protect others around you: Learn how to safely use, store and throw away your medicines at www.disposemymeds.org.          This list is accurate as of 5/14/18  8:11 AM.  Always use your most recent med list.                   Brand Name Dispense Instructions for use Diagnosis    albuterol 108 (90 Base) MCG/ACT Inhaler    PROAIR HFA/PROVENTIL HFA/VENTOLIN HFA     Inhale 2 puffs into the lungs        aspirin 81 MG tablet      Take 81 mg by mouth        atorvastatin 40 MG tablet    LIPITOR     Take 40 mg by mouth        fluticasone 220 MCG/ACT Inhaler    FLOVENT HFA     Inhale 2 puffs into the lungs        fluticasone 50 MCG/ACT spray    FLONASE     2 sprays        loratadine 10 MG tablet    CLARITIN     Take 10 mg by mouth        pantoprazole 40 MG EC tablet    PROTONIX     Take 40 mg by mouth         Potassium Gluconate 595 (99 K) MG Tabs           pramipexole 0.125 MG tablet    MIRAPEX     Take 0.125 mg by mouth        zolpidem 5 MG tablet    AMBIEN     Take 5 mg by mouth

## 2019-04-29 ENCOUNTER — TRANSFERRED RECORDS (OUTPATIENT)
Dept: PHYSICAL THERAPY | Facility: CLINIC | Age: 69
End: 2019-04-29

## 2019-04-29 ENCOUNTER — THERAPY VISIT (OUTPATIENT)
Dept: PHYSICAL THERAPY | Facility: CLINIC | Age: 69
End: 2019-04-29
Payer: COMMERCIAL

## 2019-04-29 DIAGNOSIS — M54.42 ACUTE LEFT-SIDED LOW BACK PAIN WITH LEFT-SIDED SCIATICA: ICD-10-CM

## 2019-04-29 PROCEDURE — 97110 THERAPEUTIC EXERCISES: CPT | Mod: GP | Performed by: PHYSICAL THERAPIST

## 2019-04-29 PROCEDURE — 97161 PT EVAL LOW COMPLEX 20 MIN: CPT | Mod: GP | Performed by: PHYSICAL THERAPIST

## 2019-04-29 PROCEDURE — 97530 THERAPEUTIC ACTIVITIES: CPT | Mod: GP | Performed by: PHYSICAL THERAPIST

## 2019-04-29 NOTE — PROGRESS NOTES
Physical Therapy Initial Examination/Evaluation  April 29, 2019    Lubna Bruce is a 68 year old  female referred to physical therapy by Dr. Peter Wells for treatment of L sided LBP with left-sided sciatica with Precautions/Restrictions/MD instructions evaluate and treat    Therapist Assessment:   1) does not tolerate prolonged postures >2 min  2) mild positive response to prone on elbow 10 x10 sec  3) mild R lateral shift in standing and WB on R in sitting  4) positive myotome/dermatome impairments      Subjective:  The week before 4/15 she had cleaned out the garage and did a bunch of yard work with no problems and then on 4/15 was finishing the remainder of the [employer] furniture move and was carrying a couple loads of clothes that were 20-30 lbs - no immediate incident but was unable to walk later that day from severe L leg pain, primarily in the glute  Chiropractor on 4/15 after initial injury - gave her prednisone (7 day pack) and muscle relaxant    Worst pain is in the glute, leg pain down to the toes at night  Walking can often produce sharp sensations in thigh that can feel like her leg is giving out  Drove to [place] the Thursday following the injury which she thinks exacerbated the injury as she was in a lot of pain when she would get out of the car    *Re-adjusted positions every 1-2 minutes throughout eval*    DOI/onset: 4/15/19 Mechanism of injury: increased heavy workload with yard/patio work  Previous Episodes: none Year of first episode: NA  Recent or major surgery: none History of accidents: none Previous treatment: Advil every four hours, heat   Imaging: none  Present Symptoms: dull 6/10 ache in L glute Improving/unchanging/worsening: staying the same, except unable to sleep since last Wednesday  Symptoms better/worse with: bending: worse, sitting worse >2 min, rising from sitting ok, standing worse >2 min, walking worse >2 min, lying worse on side/back, time of day: worst in the  morning  Pain: rest 6 /10, activity 9/10 at night  Sleeping: interrupted; Sleeping posture: side sleeper on L to avoid R shoulder pain  Chief Complaint: constantly switching positions due to pain making it unable to tolerate her job as a , not currently enjoying what she used to enjoy (yardwork, cooking, household chores)  Social history: many grand kids   Occupation: Semi-retired teacher  Job duties:  Driving, lifting/carrying    Current HEP/exercise regimen: walking for exercise - currently not tolerating walking >2 min  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     Other pertinent PMH/Red Flags: lung cancer 2005 followed by partial lung resection, TIAs due to hole in the heart, emphysema following lung resection, sleep disorder, overweight, pain at night General health as reported by patient: good  Return to MD:  PRN     Objective:    Dynamic Movement Screen  Single leg stance observations: Not assessed  Double limb squat observations: Anterior knee translation, Narrow GIGI and impaired due to increased pain  Single limb squat observations: Not assessed  Gait: Lateral trunk lean R, Compensated Trandelenburg L and Decreased trunk rotation BL    Sitting posture: mildly flexed and R trunk lean Response to corrected sitting posture: increased pain  Standing posture: mildly flexed and R trunk lean Lateral shift: mild right  Movement Loss   Major Moderate Minimal Nil Pain   Flexion   X  X worse in    Extension    X    Side Gliding R    X    Side Gliding L    X      Test Movements  During: Produces, abolishes, increases, decreases, nil, centralising, peripheralising  After: Better, worse, no better, no worse, no effect, centralised, peripheralised     Symptoms During Testing Symptoms After Testing   Pretest symptoms standing     FIS worse peripheralizes   Rep FIS     EIS Better No better/worse   Rep EIS better No better/worse   Pretest symptoms lying     GRISELDA     Rep GRISELDA     EIL better No  better/worse   Rep EIL better centralizes     Hip Joint Screen NA    Myotomes   MMT L2- Hip Flexion L3/4- Knee Extension L4/5- DF L5- Great Toe Ext S1- PF Knee Flexion   Left 4/5 5-/5 4+/5      Right 4-/5   Produces L sided pain 5/5 5/5        Dermatomes   Light Touch L2- medial thigh L3- medial knee L4- medial ankle L5- dormsum of foot S1- lateral ankle S2- posteriormedial thigh   Left WNL hyper WNL WNL WNL WNL   Right             Palpation:  Mild tenderness to palpation at L sided erector spinae ~L3-4    Assessment/Plan:  Patient is a 68 year old female with lumbar complaints.    Patient has the following significant findings with corresponding treatment plan.                Diagnosis 1:  L-sided lumbar radiculopathy consistent with disc protrusion  Pain -  hot/cold therapy, US, electric stimulation, mechanical traction, manual therapy, STS, splint/taping/bracing/orthotics, self management, education, directional preference exercise and home program  Decreased ROM/flexibility - manual therapy, therapeutic exercise, therapeutic activity and home program  Decreased joint mobility - manual therapy, therapeutic exercise, therapeutic activity and home program  Decreased strength - therapeutic exercise, therapeutic activities and home program  Impaired gait - gait training and home program  Impaired muscle performance - electric stimulation, neuro re-education and home program  Decreased function - therapeutic activities and home program  Impaired posture - neuro re-education, therapeutic activities and home program    Therapy Evaluation Codes:     Cumulative Therapy Evaluation is: Low complexity.    Previous and current functional limitations:  (See Goal Flow Sheet for this information)    Short term and Long term goals: (See Goal Flow Sheet for this information)     Communication ability:  Patient appears to be able to clearly communicate and understand verbal and written communication and follow directions  correctly.  Treatment Explanation - The following has been discussed with the patient:   RX ordered/plan of care  Anticipated outcomes  Possible risks and side effects  This patient would benefit from PT intervention to resume normal activities.   Rehab potential is good.    Frequency:  1 X week, once daily  Duration:  for 6 weeks  Discharge Plan:  Achieve all LTG.  Independent in home treatment program.  Reach maximal therapeutic benefit.    Please refer to the daily flowsheet for treatment today, total treatment time and time spent performing 1:1 timed codes.

## 2019-04-29 NOTE — LETTER
[facility] ATHLETIC [facility] PHYSICAL THERAPY  [address]  [place] [id number] [phone]    2019    Re: Lubna Bruce   :   1950  MRN:  [id number]   REFERRING PHYSICIAN:   Peter Wells    [facility] ATHLET[facility] PHYSICAL [facility]    Date of Initial Evaluation:  2019  Visits:  Rxs Used: 1  Reason for Referral:  Acute left-sided low back pain with left-sided sciatica    EVALUATION SUMMARY    Physical Therapy Initial Examination/Evaluation  2019    Lubna Bruce is a 68 year old  female referred to physical therapy by Dr. Peter Wells for treatment of L sided LBP with left-sided sciatica with Precautions/Restrictions/MD instructions evaluate and treat    Therapist Assessment:   1) does not tolerate prolonged postures >2 min  2) mild positive response to prone on elbow 10 x10 sec  3) mild R lateral shift in standing and WB on R in sitting  4) positive myotome/dermatome impairments      Subjective:  The week before 4/15 she had cleaned out the garage and did a bunch of yard work with no problems and then on 4/15 was finishing the remainder of the [employer] furniture move and was carrying a couple loads of clothes that were 20-30 lbs - no immediate incident but was unable to walk later that day from severe L leg pain, primarily in the glute  Chiropractor on 4/15 after initial injury - gave her prednisone (7 day pack) and muscle relaxant    Worst pain is in the glute, leg pain down to the toes at night  Walking can often produce sharp sensations in thigh that can feel like her leg is giving out  Drove to [place] the Thursday following the injury which she thinks exacerbated the injury as she was in a lot of pain when she would get out of the car    *Re-adjusted positions every 1-2 minutes throughout eval*    DOI/onset: 4/15/19 Mechanism of injury: increased heavy workload with yard/patio work  Previous  Episodes: none Year of first episode: NA  Recent or major surgery: none History of accidents: none Previous treatment:  Re: Lubna Bruce   :   1950         Advil every four hours, heat   Imaging: none  Present Symptoms: dull 6/10 ache in L glute Improving/unchanging/worsening: staying the same, except unable to sleep since last Wednesday  Symptoms better/worse with: bending: worse, sitting worse >2 min, rising from sitting ok, standing worse >2 min, walking worse >2 min, lying worse on side/back, time of day: worst in the morning  Pain: rest 6 /10, activity 9/10 at night  Sleeping: interrupted; Sleeping posture: side sleeper on L to avoid R shoulder pain  Chief Complaint: constantly switching positions due to pain making it unable to tolerate her job as a , not currently enjoying what she used to enjoy (yardwork, cooking, household chores)  Social history: many grand kids   Occupation: Semi-retired teacher  Job duties:  Driving, lifting/carrying    Current HEP/exercise regimen: walking for exercise - currently not tolerating walking >2 min  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     Other pertinent PMH/Red Flags: lung cancer 2005 followed by partial lung resection, TIAs due to hole in the heart, emphysema following lung resection, sleep disorder, overweight, pain at night General health as reported by patient: good  Return to MD:  PRN     Objective:    Dynamic Movement Screen  Single leg stance observations: Not assessed  Double limb squat observations: Anterior knee translation, Narrow GIGI and impaired due to increased pain  Single limb squat observations: Not assessed  Gait: Lateral trunk lean R, Compensated Trandelenburg L and Decreased trunk rotation BL    Sitting posture: mildly flexed and R trunk lean Response to corrected sitting posture: increased pain  Standing posture: mildly flexed and R trunk lean Lateral shift: mild right  Movement Loss   Major Moderate  Minimal Nil Pain   Flexion   X  X worse in    Extension    X    Side Gliding R    X    Side Gliding L    X      Test Movements  During: Produces, abolishes, increases, decreases, nil, centralising, peripheralising  After: Better, worse, no better, no worse, no effect, centralised, peripheralised  Re: Lubna Bruce   :   1950         Symptoms During Testing Symptoms After Testing   Pretest symptoms standing     FIS worse peripheralizes   Rep FIS     EIS Better No better/worse   Rep EIS better No better/worse   Pretest symptoms lying     GRISELDA     Rep GRISELDA     EIL better No better/worse   Rep EIL better centralizes     Hip Joint Screen NA    Myotomes   MMT L2- Hip Flexion L3/4- Knee Extension L4/5- DF L5- Great Toe Ext S1- PF Knee Flexion   Left 4/5 5-/5 4+/5      Right 4-/5   Produces L sided pain 5/5 5/5        Dermatomes   Light Touch L2- medial thigh L3- medial knee L4- medial ankle L5- dormsum of foot S1- lateral ankle S2- posteriormedial thigh   Left WNL hyper WNL WNL WNL WNL   Right             Palpation:  Mild tenderness to palpation at L sided erector spinae ~L3-4    Assessment/Plan:  Patient is a 68 year old female with lumbar complaints.    Patient has the following significant findings with corresponding treatment plan.                Diagnosis 1:  L-sided lumbar radiculopathy consistent with disc protrusion  Pain -  hot/cold therapy, US, electric stimulation, mechanical traction, manual therapy, STS, splint/taping/bracing/orthotics, self management, education, directional preference exercise and home program  Decreased ROM/flexibility - manual therapy, therapeutic exercise, therapeutic activity and home program  Decreased joint mobility - manual therapy, therapeutic exercise, therapeutic activity and home program  Decreased strength - therapeutic exercise, therapeutic activities and home program  Impaired gait - gait training and home program  Impaired muscle performance - electric  stimulation, neuro re-education and home  Re: Lubna Bruce   :   1950        program  Decreased function - therapeutic activities and home program  Impaired posture - neuro re-education, therapeutic activities and home program    Therapy Evaluation Codes:     Cumulative Therapy Evaluation is: Low complexity.    Previous and current functional limitations:  (See Goal Flow Sheet for this information)    Short term and Long term goals: (See Goal Flow Sheet for this information)     Communication ability:  Patient appears to be able to clearly communicate and understand verbal and written communication and follow directions correctly.  Treatment Explanation - The following has been discussed with the patient:   RX ordered/plan of care  Anticipated outcomes  Possible risks and side effects  This patient would benefit from PT intervention to resume normal activities.   Rehab potential is good.    Frequency:  1 X week, once daily  Duration:  for 6 weeks  Discharge Plan:  Achieve all LTG.  Independent in home treatment program.  Reach maximal therapeutic benefit.    Thank you for your referral.      INQUIRIES  Therapist: Portia Ang DPT   [facility] PHYSICAL THERAPY  [address] [place] [address]. [address]  [facility] [address]  Phone: [phone]  Fax: [phone]

## 2019-05-06 PROBLEM — M25.511 CHRONIC RIGHT SHOULDER PAIN: Status: RESOLVED | Noted: 2018-04-23 | Resolved: 2019-05-06

## 2019-05-06 PROBLEM — G89.29 CHRONIC RIGHT SHOULDER PAIN: Status: RESOLVED | Noted: 2018-04-23 | Resolved: 2019-05-06

## 2019-05-09 NOTE — PROGRESS NOTES
Subjective:  HPI                    Objective:  System    Physical Exam    General     ROS    Assessment/Plan:    SUBJECTIVE  Subjective changes as noted by pt:    A little better from last session. Raked this weekend which aggravated, usually more sore in the morning. Performing the wand exercises 2x/day and cervical extension 4x/day.       Current pain level: 3/10     Changes in function:  Yes (See Goal flowsheet attached for changes in current functional level)     Adverse reaction to treatment or activity:  None    OBJECTIVE  Changes in objective findings:  Yes, R shoulder AROM WNL/WNL/WNL/min loss.  Min loss cervical extension.         ASSESSMENT  Lubna continues to require intervention to meet STG and LTG's: PT  Patient's symptoms are resolving.  Patient is progressing as expected.  Response to therapy has shown an improvement in  pain level, ROM  and function  Progress made towards STG/LTG?  Yes (See Goal flowsheet attached for updates on achievement of STG and LTG)    PLAN  Continue current treatment plan until patient demonstrates readiness to progress to higher level exercises.    PTA/ATC plan:  N/A    Please refer to the daily flowsheet for treatment today, total treatment time and time spent performing 1:1 timed codes.          
(1) weak, irregular

## 2019-07-23 PROBLEM — M54.42 ACUTE LEFT-SIDED LOW BACK PAIN WITH LEFT-SIDED SCIATICA: Status: RESOLVED | Noted: 2019-04-29 | Resolved: 2019-07-23

## 2020-03-11 ENCOUNTER — HEALTH MAINTENANCE LETTER (OUTPATIENT)
Age: 70
End: 2020-03-11

## 2021-01-03 ENCOUNTER — HEALTH MAINTENANCE LETTER (OUTPATIENT)
Age: 71
End: 2021-01-03

## 2021-04-25 ENCOUNTER — HEALTH MAINTENANCE LETTER (OUTPATIENT)
Age: 71
End: 2021-04-25

## 2021-07-14 ENCOUNTER — TRANSFERRED RECORDS (OUTPATIENT)
Dept: PHYSICAL THERAPY | Facility: CLINIC | Age: 71
End: 2021-07-14

## 2021-07-29 ENCOUNTER — THERAPY VISIT (OUTPATIENT)
Dept: PHYSICAL THERAPY | Facility: CLINIC | Age: 71
End: 2021-07-29
Payer: COMMERCIAL

## 2021-07-29 DIAGNOSIS — G89.29 CHRONIC RIGHT SHOULDER PAIN: ICD-10-CM

## 2021-07-29 DIAGNOSIS — M54.50 CHRONIC BILATERAL LOW BACK PAIN WITHOUT SCIATICA: ICD-10-CM

## 2021-07-29 DIAGNOSIS — M25.511 CHRONIC RIGHT SHOULDER PAIN: ICD-10-CM

## 2021-07-29 DIAGNOSIS — G89.29 CHRONIC BILATERAL LOW BACK PAIN WITHOUT SCIATICA: ICD-10-CM

## 2021-07-29 PROCEDURE — 97110 THERAPEUTIC EXERCISES: CPT | Mod: GP | Performed by: PHYSICAL THERAPIST

## 2021-07-29 PROCEDURE — 97161 PT EVAL LOW COMPLEX 20 MIN: CPT | Mod: GP | Performed by: PHYSICAL THERAPIST

## 2021-07-29 NOTE — LETTER
[facility]  [address]  [facility] [address]  [phone]    2021    Re: Lubna Bruce   :   1950  MRN:  [id number]   REFERRING PHYSICIAN:   Peter MACK [facility]  Date of Initial Evaluation: 21  Visits:  Rxs Used: 1  Reason for Referral:     Chronic right shoulder pain  Chronic bilateral low back pain without sciatica    EVALUATION SUMMARY    Physical Therapy Initial Evaluation    Subjective:  The history is provided by the patient. No  was used.     Therapist Generated HPI Evaluation  Problem details: Patient reports she has been getting shots in her right shoulder for 3 years and 1st one in left shoulder about 6 months ago. This is because it is hard for her to reach behind her and overhead. Re-furbishes furniture and this also aggravates. Frayed labrum on right and deals with bicep like pain. .         Type of problem:  Bilateral shoulders.    This is a chronic (21 \) condition.  Condition occurred with:  Degenerative joint disease and repetition/overuse.  Where condition occurred: for unknown reasons.  Patient reports pain:  Anterior.  Pain is described as aching and burning and is intermittent.  Pain radiates to:  Upper arm. Pain is the same all the time.  Since onset symptoms are unchanged.  Associated symptoms:  Loss of strength and painful arc. Symptoms are exacerbated by lifting (carrying, lying in bed with arm overhead. )  Relieved by: injections.  Special tests included:  MRI (right shoulder labral tear, ).  Previous treatment includes chiropractic. There was none improvement following previous treatment.  Barriers include:  None as reported by patient.    Patient Health History  Lubna Bruce being seen for back and shoulder pain.   Problem began: 2021.   Problem occurred: arthritis, over the years.    Pain is reported as 4/10 on  pain scale.  General health as reported by patient is good.      Pertinent medical history includes: cancer, emphysema, menopausal, numbness/tingling, rheumatoid arthritis, sleep disorder/apnea and stroke.   Red flags:  None as reported by patient.  Other medical allergies details: morphine.   Other surgery history details: ablation, cataracts procedure, hystrectomy, tubligation, Tonsillectomy, left lobeectomy, .    Current medications:  High blood pressure medication.    Current occupation is .   Primary job tasks include:  Pushing/pulling and prolonged sitting.                Therapist Generated HPI Evaluation  Problem details: Back pain for years so not sure when it started. Use to beable to manage it but now to the point were advil no longer helps. .         Type of problem:  Lumbar.    This is a chronic (7/13/21) condition.  Condition occurred with:  Degenerative joint disease.  Where condition occurred: for unknown reasons.    Patient reports pain:  Lower lumbar spine.  Pain is described as aching and is constant.  Pain radiates to:  Gluteals right and gluteals left. Pain is the same all the time.  Since onset symptoms are unchanged.    Associated symptoms:  Loss of strength and loss of motion/stiffness. Exacerbated by: driving in car stopping and going, riding on bumpy roads, lifting.  and relieved by nothing (ablation).    Previous treatment includes chiropractic.   Barriers include:  None as reported by patient.    Objective:       Lumbar/SI Evaluation  Lumbar Myotomes:  normal  Lumbar Dermtomes:  normal    Shoulder Evaluation:  ROM:  AROM:    Flexion:  Left:  152    Right:  150  Extension: Left: 50Right: 50  Abduction:  Left: 135 ERP   Right:  135 with pain at 90 deg  External Rotation:  Left:  64    Right:  56  Extension/Internal Rotation:  Left:  Gluteal    Right:  GLuteal      PROM:    Flexion:  Left:  150    Right: 150    Abduction:  Left:  160    Right:  150    Strength:    Flexion:  Left:5/5 Strong/painful    Pain:    Right: 4/5  Weak/painful     Pain:   Extension:  Left: 5/5  Strong/pain free    Pain:    Right: 5/5    Strong/pain free  Pain:  Abduction:  Right: 5/5   Strong/painful    Pain:  Adduction:  Left: 5/5   Strong/pain free   Pain:      Internal Rotation:  Left:5/5   Strong/pain free    Pain:    Right: 5/5   Strong/painful    Pain:  External Rotation:   Left:5/5   Strong/pain free    Pain:   Right:5/5   Strong/pain free    Pain:      Elbow Flexion:  Left:5/5   Strong/pain free    Pain:    Right:5/5   Strong/pain free    Pain:  Elbow Extension:  Left:5/5   Strong/pain free    Pain:    Right:5/5   Strong/pain free    Pain:    Stability Testing:    Left shoulder stability negative testing:  Internal Rotation  Right shoulder stability negative testing:  Internal Rotation    Elsie Lumbar Evaluation  Movement Loss:  Flexion (Flex): min  Extension (EXT): mod and pain  Side [place] R (SG R): min  Side Glide L (SG L): min    Test Movements:  FIS: During: no effect  After: no effect    Repeat FIS: During: no effect  After: no effect    EIS: During: increases  After: no worse    Repeat EIS: During: increases  After: no worse  Mechanical Response: IncROM    Conclusion: derangement    Assessment/Plan:    Patient is a 70 year old female with lumbar and right side shoulder complaints.    Patient has the following significant findings with corresponding treatment plan.                  Diagnosis 1:  Chronic right shoulder pain  Pain -  hot/cold therapy, US, manual therapy, self management, directional preference exercise and home program  Decreased ROM/flexibility - manual therapy, therapeutic exercise, therapeutic activity and home program  Decreased joint mobility - manual therapy, therapeutic exercise, therapeutic activity and home program  Decreased strength - therapeutic exercise, therapeutic activities and home program  Decreased function - therapeutic activities and home program    Diagnosis  2:  Chronic low back pain   Pain -  hot/cold therapy, manual therapy, self management, education, directional preference exercise and home program  Decreased ROM/flexibility - manual therapy, therapeutic exercise, therapeutic activity and home program  Decreased function - therapeutic activities and home program  Impaired posture - neuro re-education, therapeutic activities and home program    Therapy Evaluation Codes:   1) History comprised of:   Personal factors that impact the plan of care:      Coping style and Time since onset of symptoms.    Comorbidity factors that impact the plan of care are:      Stroke.     Medications impacting care: None.  2) Examination of Body Systems comprised of:   Body structures and functions that impact the plan of care:      Lumbar spine and Shoulder.   Activity limitations that impact the plan of care are:      Bathing, Driving, Dressing, Lifting and Sitting.  3) Clinical presentation characteristics are:   Stable/Uncomplicated.  4) Decision-Making    Low complexity using standardized patient assessment instrument and/or measureable assessment of functional outcome.  Cumulative Therapy Evaluation is: Low complexity.    Previous and current functional limitations:  (See Goal Flow Sheet for this information)    Short term and Long term goals: (See Goal Flow Sheet for this information)     Communication ability:  Patient appears to be able to clearly communicate and understand verbal and written communication and follow directions correctly.    Treatment Explanation - The following has been discussed with the patient:   RX ordered/plan of care  Anticipated outcomes  Possible risks and side effects    This patient would benefit from PT intervention to resume normal activities.   Rehab potential is good.  Frequency:  1 X week, once daily  Duration:  for 8 weeks  Discharge Plan:  Achieve all LTG.  Independent in home treatment program.  Reach maximal therapeutic benefit.    Thank you  for your referral.    INQUIRIES  Therapist: Oscar Randolph DPT  [facility]  [address]  [facility] [address]  Phone: [phone]  Fax: [phone]

## 2021-07-29 NOTE — PROGRESS NOTES
Physical Therapy Initial Evaluation  Subjective:  The history is provided by the patient. No  was used.   Therapist Generated HPI Evaluation  Problem details: Patient reports she has been getting shots in her right shoulder for 3 years and 1st one in left shoulder about 6 months ago. This is because it is hard for her to reach behind her and overhead. Re-furbishes furniture and this also aggravates. Frayed labrum on right and deals with bicep like pain. .         Type of problem:  Bilateral shoulders.    This is a chronic (7/13/21 \) condition.  Condition occurred with:  Degenerative joint disease and repetition/overuse.  Where condition occurred: for unknown reasons.  Patient reports pain:  Anterior.  Pain is described as aching and burning and is intermittent.  Pain radiates to:  Upper arm. Pain is the same all the time.  Since onset symptoms are unchanged.  Associated symptoms:  Loss of strength and painful arc. Symptoms are exacerbated by lifting (carrying, lying in bed with arm overhead. )  Relieved by: injections.  Special tests included:  MRI (right shoulder labral tear, ).  Previous treatment includes chiropractic. There was none improvement following previous treatment.  Barriers include:  None as reported by patient.    Patient Health History  Lubna Bruce being seen for back and shoulder pain.     Problem began: 7/13/2021.   Problem occurred: arthritis, over the years.    Pain is reported as 4/10 on pain scale.  General health as reported by patient is good.  Pertinent medical history includes: cancer, emphysema, menopausal, numbness/tingling, rheumatoid arthritis, sleep disorder/apnea and stroke.   Red flags:  None as reported by patient.   Other medical allergies details: morphine.    Other surgery history details: ablation, cataracts procedure, hystrectomy, tubligation, Tonsillectomy, left lobeectomy, .    Current medications:  High blood pressure medication.    Current  occupation is .   Primary job tasks include:  Pushing/pulling and prolonged sitting.                  Therapist Generated HPI Evaluation  Problem details: Back pain for years so not sure when it started. Use to beable to manage it but now to the point were advil no longer helps. .         Type of problem:  Lumbar.    This is a chronic (7/13/21) condition.  Condition occurred with:  Degenerative joint disease.  Where condition occurred: for unknown reasons.  Patient reports pain:  Lower lumbar spine.  Pain is described as aching and is constant.  Pain radiates to:  Gluteals right and gluteals left. Pain is the same all the time.  Since onset symptoms are unchanged.  Associated symptoms:  Loss of strength and loss of motion/stiffness. Exacerbated by: driving in car stopping and going, riding on bumpy roads, lifting.  and relieved by nothing (ablation).    Previous treatment includes chiropractic.   Barriers include:  None as reported by patient.                        Objective:  System         Lumbar/SI Evaluation    Lumbar Myotomes:  normal                Lumbar Dermtomes:  normal                                    Shoulder Evaluation:  ROM:  AROM:    Flexion:  Left:  152    Right:  150  Extension: Left: 50Right: 50  Abduction:  Left: 135 ERP   Right:  135 with pain at 90 deg      External Rotation:  Left:  64    Right:  56            Extension/Internal Rotation:  Left:  Gluteal    Right:  GLuteal    PROM:    Flexion:  Left:  150    Right: 150      Abduction:  Left:  160    Right:  150                          Strength:    Flexion: Left:5/5 Strong/painful    Pain:    Right: 4/5  Weak/painful     Pain:   Extension:  Left: 5/5  Strong/pain free    Pain:    Right: 5/5    Strong/pain free  Pain:  Abduction:  Right: 5/5   Strong/painful    Pain:  Adduction:  Left: 5/5   Strong/pain free   Pain:      Internal Rotation:  Left:5/5   Strong/pain free    Pain:    Right: 5/5   Strong/painful     Pain:  External Rotation:   Left:5/5   Strong/pain free    Pain:   Right:5/5   Strong/pain free    Pain:        Elbow Flexion:  Left:5/5   Strong/pain free    Pain:    Right:5/5   Strong/pain free    Pain:  Elbow Extension:  Left:5/5   Strong/pain free    Pain:    Right:5/5   Strong/pain free    Pain:  Stability Testing:      Left shoulder stability negative testing:  Internal Rotation    Right shoulder stability negative testing:  Internal Rotation                                           Elsie Lumbar Evaluation      Movement Loss:  Flexion (Flex): min  Extension (EXT): mod and pain  Side [place] R (SG R): min  Side Glide L (SG L): min  Test Movements:  FIS: During: no effect  After: no effect    Repeat FIS: During: no effect  After: no effect    EIS: During: increases  After: no worse    Repeat EIS: During: increases  After: no worse  Mechanical Response: IncROM            Conclusion: derangement                                         ROS    Assessment/Plan:    Patient is a 70 year old female with lumbar and right side shoulder complaints.    Patient has the following significant findings with corresponding treatment plan.                  Diagnosis 1:  Chronic right shoulder pain  Pain -  hot/cold therapy, US, manual therapy, self management, directional preference exercise and home program  Decreased ROM/flexibility - manual therapy, therapeutic exercise, therapeutic activity and home program  Decreased joint mobility - manual therapy, therapeutic exercise, therapeutic activity and home program  Decreased strength - therapeutic exercise, therapeutic activities and home program  Decreased function - therapeutic activities and home program  Diagnosis 2:  Chronic low back pain   Pain -  hot/cold therapy, manual therapy, self management, education, directional preference exercise and home program  Decreased ROM/flexibility - manual therapy, therapeutic exercise, therapeutic activity and home program  Decreased  function - therapeutic activities and home program  Impaired posture - neuro re-education, therapeutic activities and home program  Therapy Evaluation Codes:   1) History comprised of:   Personal factors that impact the plan of care:      Coping style and Time since onset of symptoms.    Comorbidity factors that impact the plan of care are:      Stroke.     Medications impacting care: None.  2) Examination of Body Systems comprised of:   Body structures and functions that impact the plan of care:      Lumbar spine and Shoulder.   Activity limitations that impact the plan of care are:      Bathing, Driving, Dressing, Lifting and Sitting.  3) Clinical presentation characteristics are:   Stable/Uncomplicated.  4) Decision-Making    Low complexity using standardized patient assessment instrument and/or measureable assessment of functional outcome.  Cumulative Therapy Evaluation is: Low complexity.    Previous and current functional limitations:  (See Goal Flow Sheet for this information)    Short term and Long term goals: (See Goal Flow Sheet for this information)     Communication ability:  Patient appears to be able to clearly communicate and understand verbal and written communication and follow directions correctly.  Treatment Explanation - The following has been discussed with the patient:   RX ordered/plan of care  Anticipated outcomes  Possible risks and side effects  This patient would benefit from PT intervention to resume normal activities.   Rehab potential is good.    Frequency:  1 X week, once daily  Duration:  for 8 weeks  Discharge Plan:  Achieve all LTG.  Independent in home treatment program.  Reach maximal therapeutic benefit.    Please refer to the daily flowsheet for treatment today, total treatment time and time spent performing 1:1 timed codes.

## 2021-08-04 ENCOUNTER — THERAPY VISIT (OUTPATIENT)
Dept: PHYSICAL THERAPY | Facility: CLINIC | Age: 71
End: 2021-08-04
Payer: COMMERCIAL

## 2021-08-04 DIAGNOSIS — M54.50 CHRONIC BILATERAL LOW BACK PAIN WITHOUT SCIATICA: ICD-10-CM

## 2021-08-04 DIAGNOSIS — M25.511 CHRONIC RIGHT SHOULDER PAIN: ICD-10-CM

## 2021-08-04 DIAGNOSIS — G89.29 CHRONIC RIGHT SHOULDER PAIN: ICD-10-CM

## 2021-08-04 DIAGNOSIS — G89.29 CHRONIC BILATERAL LOW BACK PAIN WITHOUT SCIATICA: ICD-10-CM

## 2021-08-04 PROCEDURE — 97110 THERAPEUTIC EXERCISES: CPT | Mod: GP | Performed by: PHYSICAL THERAPY ASSISTANT

## 2021-10-10 ENCOUNTER — HEALTH MAINTENANCE LETTER (OUTPATIENT)
Age: 71
End: 2021-10-10

## 2021-12-07 PROBLEM — G89.29 CHRONIC BILATERAL LOW BACK PAIN WITHOUT SCIATICA: Status: RESOLVED | Noted: 2021-07-29 | Resolved: 2021-12-07

## 2021-12-07 PROBLEM — G89.29 CHRONIC RIGHT SHOULDER PAIN: Status: RESOLVED | Noted: 2021-07-29 | Resolved: 2021-12-07

## 2021-12-07 PROBLEM — M54.50 CHRONIC BILATERAL LOW BACK PAIN WITHOUT SCIATICA: Status: RESOLVED | Noted: 2021-07-29 | Resolved: 2021-12-07

## 2021-12-07 PROBLEM — M25.511 CHRONIC RIGHT SHOULDER PAIN: Status: RESOLVED | Noted: 2021-07-29 | Resolved: 2021-12-07

## 2021-12-07 NOTE — PROGRESS NOTES
DISCHARGE SUMMARY    Lubna Bruce was seen 2 times for evaluation and treatment.  Patient did not return for further treatment and current status is unknown.  Due to short treatment duration, no objective or functional changes were made.  Please see goal flow sheet from episode noted date below and initial evaluation for further information.  Patient is discharged from therapy and therapy episode is resolved as of 12/07/21.      Linked Episodes   Type: Episode: Status: Noted: Resolved: Last update: Updated by:   PHYSICAL THERAPY neck / back / bilateral shoulder pain 7/29/21 Active 7/29/2021 8/4/2021 12:54 PM Oscar Randolph, PT      Comments:

## 2022-03-07 ENCOUNTER — TRANSCRIBE ORDERS (OUTPATIENT)
Dept: OTHER | Age: 72
End: 2022-03-07
Payer: COMMERCIAL

## 2022-03-07 DIAGNOSIS — M54.2 NECK PAIN: Primary | ICD-10-CM

## 2022-03-26 ENCOUNTER — HEALTH MAINTENANCE LETTER (OUTPATIENT)
Age: 72
End: 2022-03-26

## 2022-05-21 ENCOUNTER — HEALTH MAINTENANCE LETTER (OUTPATIENT)
Age: 72
End: 2022-05-21

## 2022-09-17 ENCOUNTER — HEALTH MAINTENANCE LETTER (OUTPATIENT)
Age: 72
End: 2022-09-17

## 2023-06-04 ENCOUNTER — HEALTH MAINTENANCE LETTER (OUTPATIENT)
Age: 73
End: 2023-06-04

## 2024-02-16 ENCOUNTER — OFFICE VISIT (OUTPATIENT)
Dept: OTOLARYNGOLOGY | Facility: CLINIC | Age: 74
End: 2024-02-16
Payer: COMMERCIAL

## 2024-02-16 VITALS — SYSTOLIC BLOOD PRESSURE: 105 MMHG | HEART RATE: 69 BPM | DIASTOLIC BLOOD PRESSURE: 23 MMHG

## 2024-02-16 DIAGNOSIS — J30.0 CHRONIC VASOMOTOR RHINITIS: ICD-10-CM

## 2024-02-16 DIAGNOSIS — J34.89 NASAL VESTIBULITIS: Primary | ICD-10-CM

## 2024-02-16 DIAGNOSIS — H61.21 IMPACTED CERUMEN OF RIGHT EAR: ICD-10-CM

## 2024-02-16 PROCEDURE — 99204 OFFICE O/P NEW MOD 45 MIN: CPT | Mod: 25 | Performed by: OTOLARYNGOLOGY

## 2024-02-16 PROCEDURE — 69210 REMOVE IMPACTED EAR WAX UNI: CPT | Performed by: OTOLARYNGOLOGY

## 2024-02-16 RX ORDER — TRIAMCINOLONE ACETONIDE 1 MG/G
OINTMENT TOPICAL ONCE
Qty: 30 G | Refills: 0 | Status: SHIPPED | OUTPATIENT
Start: 2024-02-16 | End: 2024-08-21

## 2024-02-16 RX ORDER — CLOPIDOGREL BISULFATE 75 MG/1
1 TABLET ORAL DAILY
COMMUNITY
Start: 2024-02-08

## 2024-02-16 RX ORDER — SOTALOL HYDROCHLORIDE 80 MG/1
TABLET ORAL
COMMUNITY

## 2024-02-16 RX ORDER — IPRATROPIUM BROMIDE 21 UG/1
2 SPRAY, METERED NASAL 2 TIMES DAILY
Qty: 30 ML | Refills: 0 | Status: SHIPPED | OUTPATIENT
Start: 2024-02-16 | End: 2024-05-17

## 2024-02-16 RX ORDER — GABAPENTIN 100 MG/1
CAPSULE ORAL
COMMUNITY
Start: 2024-02-12

## 2024-02-16 RX ORDER — TORSEMIDE 20 MG/1
10 TABLET ORAL
COMMUNITY
Start: 2024-02-08

## 2024-02-16 RX ORDER — BUDESONIDE, GLYCOPYRROLATE, AND FORMOTEROL FUMARATE 160; 9; 4.8 UG/1; UG/1; UG/1
1 AEROSOL, METERED RESPIRATORY (INHALATION)
COMMUNITY
Start: 2023-07-06

## 2024-02-16 RX ORDER — ROSUVASTATIN CALCIUM 40 MG/1
40 TABLET, COATED ORAL DAILY
COMMUNITY

## 2024-02-16 RX ORDER — MUPIROCIN 20 MG/G
OINTMENT TOPICAL DAILY
Qty: 30 G | Refills: 0 | Status: SHIPPED | OUTPATIENT
Start: 2024-02-16 | End: 2024-03-19

## 2024-02-16 RX ORDER — NITROGLYCERIN 0.4 MG/1
TABLET SUBLINGUAL
COMMUNITY

## 2024-02-16 ASSESSMENT — ENCOUNTER SYMPTOMS
CONSTITUTIONAL NEGATIVE: 1
EYES NEGATIVE: 1
RESPIRATORY NEGATIVE: 1
GASTROINTESTINAL NEGATIVE: 1

## 2024-02-16 NOTE — PROGRESS NOTES
Lubna Bruce's chief complaint for this visit includes:  Chief Complaint   Patient presents with    Consult     Sore in nose for past 2 1/2 months. Has been using bacitracin and helping heal the scab, but doesn't take it away.      PCP: Peter Wells    Referring Provider:  No referring provider defined for this encounter.    BP (!) 105/23   Pulse 69

## 2024-02-16 NOTE — PROGRESS NOTES
Chief Complaint   Patient presents with    Consult     Sore in nose for past 2 1/2 months. Has been using bacitracin and helping heal the scab, but doesn't take it away.       PCP: Peter Wells     Referring Provider: No ref. provider found    There were no vitals taken for this visit.    ENT Problem List:  Patient Active Problem List   Diagnosis Code   (none) - all problems resolved or deleted      Current Medications:  Current Outpatient Medications   Medication    albuterol (PROAIR HFA/PROVENTIL HFA/VENTOLIN HFA) 108 (90 BASE) MCG/ACT Inhaler    aspirin 81 MG tablet    atorvastatin (LIPITOR) 40 MG tablet    fluticasone (FLONASE) 50 MCG/ACT spray    fluticasone (FLOVENT HFA) 220 MCG/ACT Inhaler    loratadine (CLARITIN) 10 MG tablet    pantoprazole (PROTONIX) 40 MG EC tablet    Potassium Gluconate 595 (99 K) MG TABS    pramipexole (MIRAPEX) 0.125 MG tablet    zolpidem (AMBIEN) 5 MG tablet     No current facility-administered medications for this visit.     XR Chest 2 Views     History: COPD     Comparison: None     Findings: Lungs and costophrenic angles are clear.  ASD closure device. The heart and mediastinum are unremarkable. The bones are unremarkable.                                                                 Impression:  Normal Chest    HPI  Pleasant 73 year old female presents today as a(n) new patient for a scabbed nose sore inside the left nostril for the past 2 1/2 months. She has been using bacitracin for 2 months and it was helping heal the scab, but does not take it away. She stopped using bacitracin a week ago. She reports that she has congestion due to emphysema, heart and lung issues, and a history of a myocardial infarction. She uses a C-PAP machine most of the time, has post nasal drainage, and has rhinitis. She uses Flonase with some improvement, but she still has rhinitis and post nasal drip. She has year round allergies to dust, and she had COVID in January. She has an endoscopy,  colonoscopy, and CAT scan for her lungs scheduled in the next week.  She denies facial pressure, facial pain, a history of head and neck surgeries, and grinding and clenching her teeth.    Review of Systems   Constitutional: Negative.    HENT:  Positive for congestion.    Eyes: Negative.    Respiratory: Negative.     Gastrointestinal: Negative.    Skin: Negative.    Endo/Heme/Allergies:  Positive for environmental allergies.       Physical Exam  Vitals and nursing note reviewed.   Constitutional:       Appearance: Normal appearance.   HENT:      Head: Normocephalic and atraumatic.      Jaw: There is normal jaw occlusion.      Right Ear: Hearing, tympanic membrane and ear canal normal. There is impacted cerumen.      Left Ear: Hearing, tympanic membrane and ear canal normal. There is impacted cerumen.      Nose: No mucosal edema, congestion or rhinorrhea.      Right Nostril: No occlusion.      Left Nostril: No occlusion.      Right Turbinates: Not enlarged or swollen.      Left Turbinates: Not enlarged or swollen.      Right Sinus: No maxillary sinus tenderness or frontal sinus tenderness.      Left Sinus: No maxillary sinus tenderness or frontal sinus tenderness.      Comments: Infection in left nostril     Mouth/Throat:      Mouth: Mucous membranes are moist.      Pharynx: Oropharynx is clear. Uvula midline.   Eyes:      Extraocular Movements: Extraocular movements intact.      Pupils: Pupils are equal, round, and reactive to light.   Neurological:      Mental Status: She is alert.       Left nasal vestibular upper pole: folliculitis seen.    Ear wax removal: The patient was seen in the room. An informed consent was obtained from the patient. Her ears were evaluated under the microscope. Right ear canal was blocked 80% with ear wax that was suctioned and removed with an alligator. She tolerated the procedure well and left the room no complications.    A/P  The patient has a folliculitis infection in her left  nostril, and since it did not go away with bacitracin it is possibly a staph infection. To eradicate the infection, the following are prescribed.   mupirocin (BACTROBAN) 2 % external ointment, apply topically daily  triamcinolone (KENALOG) 0.1 % external ointment, apply topically once for 1 dose  The patient is advised to use one cream in the morning and one cream at night. To prevent future infections, the patient is recommended to wash their hands before touching their nose.     For her nasal discharge, ipratropium (ATROVENT) 0.03 % nasal spray,spray 2 sprays into both nostrils 2 times daily, is prescribed today and the patient is told to stop using Flonase. She can use this nasal spray after applying one of the prescription creams. The patient is advised to use a humidifier and stay hydrated to help with dry nose. Concerns and questions were addressed.    Follow up in clinic in 1 month.    Scribe/Staff:    Scribe Disclosure:   I, Harmony Rowley, am serving as a scribe; to document services personally performed by Hans Greenfield MD based on data collection and the provider's statements to me.     Provider Disclosure:  I agree with above History, Review of Systems, Physical exam and Plan.  I have reviewed the content of the documentation and have edited it as needed. I have personally performed the services documented here and the documentation accurately represents those services and the decisions I have made.      Electronically signed by:  Hans Greenfield MD

## 2024-02-16 NOTE — LETTER
2/16/2024         RE: Lubna Bruce  [address]  [facility] [address]        Dear Colleague,    Thank you for referring your patient, Lubna Bruce, to the [facility]. Please see a copy of my visit note below.    Chief Complaint   Patient presents with     Consult     Sore in nose for past 2 1/2 months. Has been using bacitracin and helping heal the scab, but doesn't take it away.       PCP: ePter Wells     Referring Provider: No ref. provider found    There were no vitals taken for this visit.    ENT Problem List:  Patient Active Problem List   Diagnosis Code   (none) - all problems resolved or deleted      Current Medications:  Current Outpatient Medications   Medication     albuterol (PROAIR HFA/PROVENTIL HFA/VENTOLIN HFA) 108 (90 BASE) MCG/ACT Inhaler     aspirin 81 MG tablet     atorvastatin (LIPITOR) 40 MG tablet     fluticasone (FLONASE) 50 MCG/ACT spray     fluticasone (FLOVENT HFA) 220 MCG/ACT Inhaler     loratadine (CLARITIN) 10 MG tablet     pantoprazole (PROTONIX) 40 MG EC tablet     Potassium Gluconate 595 (99 K) MG TABS     pramipexole (MIRAPEX) 0.125 MG tablet     zolpidem (AMBIEN) 5 MG tablet     No current facility-administered medications for this visit.     XR Chest 2 Views     History: COPD     Comparison: None     Findings: Lungs and costophrenic angles are clear.  ASD closure device. The heart and mediastinum are unremarkable. The bones are unremarkable.                                                                 Impression:  Normal Chest    HPI  Pleasant 73 year old female presents today as a(n) new patient for a scabbed nose sore inside the left nostril for the past 2 1/2 months. She has been using bacitracin for 2 months and it was helping heal the scab, but does not take it away. She stopped using bacitracin a week ago. She reports that she has congestion due to emphysema, heart and lung issues, and a history of a myocardial  infarction. She uses a C-PAP machine most of the time, has post nasal drainage, and has rhinitis. She uses Flonase with some improvement, but she still has rhinitis and post nasal drip. She has year round allergies to dust, and she had COVID in January. She has an endoscopy, colonoscopy, and CAT scan for her lungs scheduled in the next week.  She denies facial pressure, facial pain, a history of head and neck surgeries, and grinding and clenching her teeth.    Review of Systems   Constitutional: Negative.    HENT:  Positive for congestion.    Eyes: Negative.    Respiratory: Negative.     Gastrointestinal: Negative.    Skin: Negative.    Endo/Heme/Allergies:  Positive for environmental allergies.       Physical Exam  Vitals and nursing note reviewed.   Constitutional:       Appearance: Normal appearance.   HENT:      Head: Normocephalic and atraumatic.      Jaw: There is normal jaw occlusion.      Right Ear: Hearing, tympanic membrane and ear canal normal. There is impacted cerumen.      Left Ear: Hearing, tympanic membrane and ear canal normal. There is impacted cerumen.      Nose: No mucosal edema, congestion or rhinorrhea.      Right Nostril: No occlusion.      Left Nostril: No occlusion.      Right Turbinates: Not enlarged or swollen.      Left Turbinates: Not enlarged or swollen.      Right Sinus: No maxillary sinus tenderness or frontal sinus tenderness.      Left Sinus: No maxillary sinus tenderness or frontal sinus tenderness.      Comments: Infection in left nostril     Mouth/Throat:      Mouth: Mucous membranes are moist.      Pharynx: Oropharynx is clear. Uvula midline.   Eyes:      Extraocular Movements: Extraocular movements intact.      Pupils: Pupils are equal, round, and reactive to light.   Neurological:      Mental Status: She is alert.       Left nasal vestibular upper pole: folliculitis seen.    Ear wax removal: The patient was seen in the room. An informed consent was obtained from the patient.  Her ears were evaluated under the microscope. Right ear canal was blocked 80% with ear wax that was suctioned and removed with an alligator. She tolerated the procedure well and left the room no complications.    A/P  The patient has a folliculitis infection in her left nostril, and since it did not go away with bacitracin it is possibly a staph infection. To eradicate the infection, the following are prescribed.   mupirocin (BACTROBAN) 2 % external ointment, apply topically daily  triamcinolone (KENALOG) 0.1 % external ointment, apply topically once for 1 dose  The patient is advised to use one cream in the morning and one cream at night. To prevent future infections, the patient is recommended to wash their hands before touching their nose.     For her nasal discharge, ipratropium (ATROVENT) 0.03 % nasal spray,spray 2 sprays into both nostrils 2 times daily, is prescribed today and the patient is told to stop using Flonase. She can use this nasal spray after applying one of the prescription creams. The patient is advised to use a humidifier and stay hydrated to help with dry nose. Concerns and questions were addressed.    Follow up in clinic in 1 month.    Scribe/Staff:    Scribe Disclosure:   I, Harmony Rowley, am serving as a scribe; to document services personally performed by Hans Greenfield MD based on data collection and the provider's statements to me.     Provider Disclosure:  I agree with above History, Review of Systems, Physical exam and Plan.  I have reviewed the content of the documentation and have edited it as needed. I have personally performed the services documented here and the documentation accurately represents those services and the decisions I have made.      Electronically signed by:  Hans Bruce's chief complaint for this visit includes:  Chief Complaint   Patient presents with     Consult     Sore in nose for past 2 1/2 months. Has been using  bacitracin and helping heal the scab, but doesn't take it away.      PCP: Peter Wells    Referring Provider:  No referring provider defined for this encounter.    BP (!) 105/23   Pulse 69             Again, thank you for allowing me to participate in the care of your patient.        Sincerely,        Hans Greenfield MD

## 2024-03-15 ASSESSMENT — ENCOUNTER SYMPTOMS
GASTROINTESTINAL NEGATIVE: 1
CONSTITUTIONAL NEGATIVE: 1
EYES NEGATIVE: 1

## 2024-03-15 NOTE — PROGRESS NOTES
Chief Complaint   Patient presents with    Follow Up     Nasal sore, now cleared up. Also on Atrovent and helping her allergies.       PCP: Peter Wells     Referring Provider: No ref. provider found    There were no vitals taken for this visit.    ENT Problem List:  Patient Active Problem List   Diagnosis Code   (none) - all problems resolved or deleted      Current Medications:  Current Outpatient Medications   Medication    ipratropium (ATROVENT) 0.03 % nasal spray    albuterol (PROAIR HFA/PROVENTIL HFA/VENTOLIN HFA) 108 (90 BASE) MCG/ACT Inhaler    aspirin 81 MG tablet    BREZTRI AEROSPHERE 160-9-4.8 MCG/ACT AERO inhaler    clopidogrel (PLAVIX) 75 MG tablet    gabapentin (NEURONTIN) 100 MG capsule    loratadine (CLARITIN) 10 MG tablet    nitroGLYcerin (NITROSTAT) 0.4 MG sublingual tablet    omeprazole (PRILOSEC) 20 MG DR capsule    Potassium Gluconate 595 (99 K) MG TABS    pramipexole (MIRAPEX) 0.125 MG tablet    rosuvastatin (CRESTOR) 40 MG tablet    sotalol (BETAPACE) 80 MG tablet    torsemide (DEMADEX) 20 MG tablet    zolpidem (AMBIEN) 5 MG tablet     No current facility-administered medications for this visit.     HPI  Pleasant 73 year old female presents today as a(n) established patient for a nasal sore that is now cleared up. She was last seen on 2/16/24. She currently has seasonal and year round allergies to mold with improvement on Atrovent. She is sneezing a lot, which does not improve with Atrovent, and coughs with acid reflux. She breathes well through her nose and uses a full face CPAP machine. She has some post nasal drip that picks up at times but is better with the use of Atrovent. She takes Zyrtec daily.  She denies facial pressure, itching.    Review of Systems   Constitutional: Negative.    Eyes: Negative.    Respiratory:  Positive for cough.    Gastrointestinal: Negative.    Skin: Negative.  Negative for itching.   Endo/Heme/Allergies:  Positive for environmental allergies.        Physical Exam  Vitals and nursing note reviewed.   Constitutional:       Appearance: Normal appearance.   HENT:      Head: Normocephalic and atraumatic.      Jaw: There is normal jaw occlusion.      Right Ear: Hearing, tympanic membrane and ear canal normal.      Left Ear: Hearing, tympanic membrane and ear canal normal.      Nose: No mucosal edema, congestion or rhinorrhea.      Right Nostril: No occlusion.      Left Nostril: No occlusion.      Right Turbinates: Not enlarged or swollen.      Left Turbinates: Not enlarged or swollen.      Right Sinus: No maxillary sinus tenderness or frontal sinus tenderness.      Left Sinus: No maxillary sinus tenderness or frontal sinus tenderness.      Mouth/Throat:      Mouth: Mucous membranes are moist.      Pharynx: Oropharynx is clear. Uvula midline.   Eyes:      Extraocular Movements: Extraocular movements intact.      Pupils: Pupils are equal, round, and reactive to light.   Neurological:      Mental Status: She is alert.       A/P  This pleasant patient has allergies that are causing sneezing, runny nose and post nasal drip. Runny nose is also potentially caused by her CPAP machine. Her previous nasal sore is cleared up. She is advised to continue with Atrovent. She is advised to have good hydration. She is informed that she can stop taking her allergy medicine or take it every other day if she does not have any allergy symptoms.    Follow up in clinic in 4 months.    Scribe/Staff:    Scribe Disclosure:   I, Harmony Rowley, am serving as a scribe; to document services personally performed by Hans Greenfield MD based on data collection and the provider's statements to me.     Provider Disclosure:  I agree with above History, Review of Systems, Physical exam and Plan.  I have reviewed the content of the documentation and have edited it as needed. I have personally performed the services documented here and the documentation accurately represents those services and  the decisions I have made.      Electronically signed by:  Hans Greenfield MD

## 2024-03-19 ENCOUNTER — OFFICE VISIT (OUTPATIENT)
Dept: OTOLARYNGOLOGY | Facility: CLINIC | Age: 74
End: 2024-03-19
Payer: COMMERCIAL

## 2024-03-19 DIAGNOSIS — J30.0 CHRONIC VASOMOTOR RHINITIS: ICD-10-CM

## 2024-03-19 DIAGNOSIS — J34.89 NASAL VESTIBULITIS: Primary | ICD-10-CM

## 2024-03-19 PROCEDURE — 99213 OFFICE O/P EST LOW 20 MIN: CPT | Performed by: OTOLARYNGOLOGY

## 2024-03-19 ASSESSMENT — ENCOUNTER SYMPTOMS: COUGH: 1

## 2024-03-19 NOTE — LETTER
3/19/2024         RE: Lubna Bruce  [address]  [facility] [address]        Dear Colleague,    Thank you for referring your patient, Lubna Bruce, to the [facility]. Please see a copy of my visit note below.    Chief Complaint   Patient presents with     Follow Up     Nasal sore, now cleared up. Also on Atrovent and helping her allergies.       PCP: Peter Wells     Referring Provider: No ref. provider found    There were no vitals taken for this visit.    ENT Problem List:  Patient Active Problem List   Diagnosis Code   (none) - all problems resolved or deleted      Current Medications:  Current Outpatient Medications   Medication     ipratropium (ATROVENT) 0.03 % nasal spray     albuterol (PROAIR HFA/PROVENTIL HFA/VENTOLIN HFA) 108 (90 BASE) MCG/ACT Inhaler     aspirin 81 MG tablet     BREZTRI AEROSPHERE 160-9-4.8 MCG/ACT AERO inhaler     clopidogrel (PLAVIX) 75 MG tablet     gabapentin (NEURONTIN) 100 MG capsule     loratadine (CLARITIN) 10 MG tablet     nitroGLYcerin (NITROSTAT) 0.4 MG sublingual tablet     omeprazole (PRILOSEC) 20 MG DR capsule     Potassium Gluconate 595 (99 K) MG TABS     pramipexole (MIRAPEX) 0.125 MG tablet     rosuvastatin (CRESTOR) 40 MG tablet     sotalol (BETAPACE) 80 MG tablet     torsemide (DEMADEX) 20 MG tablet     zolpidem (AMBIEN) 5 MG tablet     No current facility-administered medications for this visit.     HPI  Pleasant 73 year old female presents today as a(n) established patient for a nasal sore that is now cleared up. She was last seen on 2/16/24. She currently has seasonal and year round allergies to mold with improvement on Atrovent. She is sneezing a lot, which does not improve with Atrovent, and coughs with acid reflux. She breathes well through her nose and uses a full face CPAP machine. She has some post nasal drip that picks up at times but is better with the use of Atrovent. She takes Zyrtec daily.  She  denies facial pressure, itching.    Review of Systems   Constitutional: Negative.    Eyes: Negative.    Respiratory:  Positive for cough.    Gastrointestinal: Negative.    Skin: Negative.  Negative for itching.   Endo/Heme/Allergies:  Positive for environmental allergies.       Physical Exam  Vitals and nursing note reviewed.   Constitutional:       Appearance: Normal appearance.   HENT:      Head: Normocephalic and atraumatic.      Jaw: There is normal jaw occlusion.      Right Ear: Hearing, tympanic membrane and ear canal normal.      Left Ear: Hearing, tympanic membrane and ear canal normal.      Nose: No mucosal edema, congestion or rhinorrhea.      Right Nostril: No occlusion.      Left Nostril: No occlusion.      Right Turbinates: Not enlarged or swollen.      Left Turbinates: Not enlarged or swollen.      Right Sinus: No maxillary sinus tenderness or frontal sinus tenderness.      Left Sinus: No maxillary sinus tenderness or frontal sinus tenderness.      Mouth/Throat:      Mouth: Mucous membranes are moist.      Pharynx: Oropharynx is clear. Uvula midline.   Eyes:      Extraocular Movements: Extraocular movements intact.      Pupils: Pupils are equal, round, and reactive to light.   Neurological:      Mental Status: She is alert.       A/P  This pleasant patient has allergies that are causing sneezing, runny nose and post nasal drip. Runny nose is also potentially caused by her CPAP machine. Her previous nasal sore is cleared up. She is advised to continue with Atrovent. She is advised to have good hydration. She is informed that she can stop taking her allergy medicine or take it every other day if she does not have any allergy symptoms.    Follow up in clinic in 4 months.    Scribe/Staff:    Scribe Disclosure:   ELIZABETH, Harmony Rowley, am serving as a scribe; to document services personally performed by Hans Greenfield MD based on data collection and the provider's statements to me.     Provider  Disclosure:  I agree with above History, Review of Systems, Physical exam and Plan.  I have reviewed the content of the documentation and have edited it as needed. I have personally performed the services documented here and the documentation accurately represents those services and the decisions I have made.      Electronically signed by:  Hans Greenfield MD       Again, thank you for allowing me to participate in the care of your patient.        Sincerely,        Hans Greenfield MD

## 2024-03-19 NOTE — NURSING NOTE
Lubna Bruce's chief complaint for this visit includes:  Chief Complaint   Patient presents with    Follow Up     Nasal sore, now cleared up. Also on Atrovent and helping her allergies.      PCP: Peter Wells    Referring Provider:  No referring provider defined for this encounter.    There were no vitals taken for this visit.

## 2024-05-17 ENCOUNTER — TELEPHONE (OUTPATIENT)
Dept: OTOLARYNGOLOGY | Facility: CLINIC | Age: 74
End: 2024-05-17
Payer: COMMERCIAL

## 2024-05-17 DIAGNOSIS — J30.0 CHRONIC VASOMOTOR RHINITIS: ICD-10-CM

## 2024-05-17 RX ORDER — IPRATROPIUM BROMIDE 21 UG/1
2 SPRAY, METERED NASAL 2 TIMES DAILY
Qty: 30 ML | Refills: 0 | Status: SHIPPED | OUTPATIENT
Start: 2024-05-17 | End: 2024-06-26

## 2024-05-17 NOTE — TELEPHONE ENCOUNTER
Received refill request for [facility] for Ipratropium spray.    Reviewed pt's chart.  Last office visit note from 3/19 says to continue with Atrovent.    Refill sent to pharmacy.  Called pt to let her know.     No answer, left generic voicemail for pt to return call to clinic.  Videumt message also sent to pt dated 5/17.    Sivan Schmidt RN

## 2024-06-26 DIAGNOSIS — J30.0 CHRONIC VASOMOTOR RHINITIS: ICD-10-CM

## 2024-06-26 RX ORDER — IPRATROPIUM BROMIDE 21 UG/1
2 SPRAY, METERED NASAL 2 TIMES DAILY
Qty: 30 ML | Refills: 3 | Status: SHIPPED | OUTPATIENT
Start: 2024-06-26

## 2024-06-26 NOTE — PROGRESS NOTES
Refill request for ipratropium nasal spray received from [facility].  Last appointment 3/19/24.  Medication refilled as requested.  Dulce Maria Burk RN

## 2024-07-13 ENCOUNTER — HEALTH MAINTENANCE LETTER (OUTPATIENT)
Age: 74
End: 2024-07-13

## 2024-07-19 NOTE — PROGRESS NOTES
Chief Complaint   Patient presents with    Follow Up     Allergic rhinitis, greatly improved since using Atrovent instead of Flonase.      PCP: Peter Wells     Referring Provider: No ref. provider found    There were no vitals taken for this visit.    ENT Problem List:  Patient Active Problem List   Diagnosis   (none) - all problems resolved or deleted      Current Medications:  Current Outpatient Medications   Medication Sig Dispense Refill    acetaminophen (TYLENOL) 650 MG CR tablet Take 650 mg by mouth 2 times daily      albuterol (PROAIR HFA/PROVENTIL HFA/VENTOLIN HFA) 108 (90 BASE) MCG/ACT Inhaler Inhale 2 puffs into the lungs      BREZTRI AEROSPHERE 160-9-4.8 MCG/ACT AERO inhaler 1 puff      Calcium Carb-Cholecalciferol (CALCIUM 600 + D PO) Take 1 tablet by mouth daily      cetirizine (ZYRTEC) 10 MG tablet Take 10 mg by mouth daily Taking the brand Allertec      clopidogrel (PLAVIX) 75 MG tablet Take 1 tablet by mouth daily      gabapentin (NEURONTIN) 100 MG capsule       ipratropium (ATROVENT) 0.03 % nasal spray Spray 2 sprays into both nostrils 2 times daily 30 mL 3    loratadine (CLARITIN) 10 MG tablet Take 10 mg by mouth      nitroGLYcerin (NITROSTAT) 0.4 MG sublingual tablet TAKE 1 TABLET SUBLINGUALLY EVERY 5 MINUTES UP TO 3 DOSES AS NEEDED FOR CHEST PAIN for 30 days      omeprazole (PRILOSEC) 20 MG DR capsule TAKE 1 CAPSULE BY MOUTH EVERY DAY 30 MINUTES BEFORE MORNING MEAL FOR 90 DAYS      Potassium Gluconate 595 (99 K) MG TABS       pramipexole (MIRAPEX) 0.125 MG tablet Take 0.125 mg by mouth      rosuvastatin (CRESTOR) 40 MG tablet Take 40 mg by mouth daily      sotalol (BETAPACE) 80 MG tablet TAKE 0.5 TABLETS (40 MG) BY MOUTH TWICE A DAY.      torsemide (DEMADEX) 20 MG tablet Take 10 mg by mouth      zolpidem (AMBIEN) 5 MG tablet Take 5 mg by mouth      aspirin 81 MG tablet Take 81 mg by mouth (Patient not taking: Reported on 7/24/2024)       No current facility-administered medications for  this visit.       HPI  Pleasant 73 year old female presents today as an established patient for allergic rhinitis. Her previous visit was on 03/19/2024, where she was advised to continue using Atrovent. Today, she reports significant improvement since using Atrovent instead of Flonase. She says that she wishes that she could use it forever, because she really likes it. She uses it once in the morning. If she is outside working, she will administer a second dose as needed. She denies facial pressure and pain. She has to make sure that she breathes, describing shallow breathing form emphysema. She uses a CPAP machine daily. Whenever not using the spray, she has a runny nose. She has seen a allergist, and had a skin test. She is allergic to dust and has to take allergy medications for year-round allergies. She describes having a buzzing sound, and decreased hearing. She has occasional dizziness with head movements and laying down.     Review of Systems   Constitutional: Negative.    HENT:  Positive for hearing loss and tinnitus.    Eyes: Negative.    Respiratory: Negative.     Gastrointestinal: Negative.    Skin: Negative.    Endo/Heme/Allergies:  Positive for environmental allergies.   All other systems reviewed and are negative.      Physical Exam  Vitals and nursing note reviewed.   Constitutional:       Appearance: Normal appearance.   HENT:      Head: Normocephalic and atraumatic.      Right Ear: Tympanic membrane, ear canal and external ear normal. There is no impacted cerumen.      Left Ear: Tympanic membrane, ear canal and external ear normal. There is no impacted cerumen.      Nose: Septal deviation and congestion present.      Right Nostril: No occlusion.      Left Nostril: No occlusion.      Mouth/Throat:      Mouth: Mucous membranes are moist.   Neurological:      Mental Status: She is alert.     + very slight septal deviation to the right  + soft tissues are shrinking    Audiogram: Audiogram was  independently reviewed.  AUDIOGRAM: She underwent an audiogram today 07/24/2024. This demonstrates:  Results: WNL to borderline WNL right. WNL to mild SNHL left . 100% word rec bilaterally. Tymps WNL. Present 1 kHz ipsi/contra reflexes bilaterally.   Right: Speech reception threshold is 15 dB with 100% word recognition. Tympanogram -- type   Left: Speech reception threshold is 15 dB with 100% word recognition. Tympanogram -- type     A/P  This pleasant patient has allergic rhinitis She has a slight septal deviation to the right, and the inferior turbinates are shrinking. Because the spray is working, she does not want to proceed with any surgical intervention. We reviewed topical nasal sprays, and she has been encouraged to continue treatment as needed. In regards to her asymmetric hearing loss, she did not want to proceed with a hearing test, but said that she would if audiology was available immediately. We were able to get her into audiology today. Her audiogram shown asymmetry at the higher frequencies. The nerves are functioning properly, and findings are suggestive issues in the inner ear. She is not interested in further investigation such as an MRI, though she has agreed to get another hearing test in a year.      Follow up in clinic in 4 months.    Scribe/Staff:    Scribe Disclosure:   I, Katherin Castillo, am serving as a scribe; to document services personally performed by Hans Greenfield MD based on data collection and the provider's statements to me.     Provider Disclosure:  I agree with above History, Review of Systems, Physical exam and Plan.  I have reviewed the content of the documentation and have edited it as needed. I have personally performed the services documented here and the documentation accurately represents those services and the decisions I have made.      Electronically signed by:  Hans Greenfield MD

## 2024-07-24 ENCOUNTER — OFFICE VISIT (OUTPATIENT)
Dept: AUDIOLOGY | Facility: CLINIC | Age: 74
End: 2024-07-24
Payer: COMMERCIAL

## 2024-07-24 ENCOUNTER — OFFICE VISIT (OUTPATIENT)
Dept: OTOLARYNGOLOGY | Facility: CLINIC | Age: 74
End: 2024-07-24
Payer: COMMERCIAL

## 2024-07-24 DIAGNOSIS — H90.3 SENSORINEURAL HEARING LOSS (SNHL) OF BOTH EARS: Primary | ICD-10-CM

## 2024-07-24 DIAGNOSIS — J30.0 CHRONIC VASOMOTOR RHINITIS: Primary | ICD-10-CM

## 2024-07-24 PROCEDURE — 99213 OFFICE O/P EST LOW 20 MIN: CPT | Performed by: OTOLARYNGOLOGY

## 2024-07-24 PROCEDURE — 92557 COMPREHENSIVE HEARING TEST: CPT | Performed by: AUDIOLOGIST

## 2024-07-24 PROCEDURE — 92550 TYMPANOMETRY & REFLEX THRESH: CPT | Performed by: AUDIOLOGIST

## 2024-07-24 RX ORDER — SENNOSIDES 8.6 MG
1300 CAPSULE ORAL
COMMUNITY
End: 2024-07-24 | Stop reason: DRUGHIGH

## 2024-07-24 RX ORDER — CETIRIZINE HYDROCHLORIDE 10 MG/1
10 TABLET ORAL DAILY
COMMUNITY

## 2024-07-24 RX ORDER — SENNOSIDES 8.6 MG
650 CAPSULE ORAL 2 TIMES DAILY
COMMUNITY

## 2024-07-24 ASSESSMENT — ENCOUNTER SYMPTOMS
GASTROINTESTINAL NEGATIVE: 1
RESPIRATORY NEGATIVE: 1
EYES NEGATIVE: 1
CONSTITUTIONAL NEGATIVE: 1

## 2024-07-24 NOTE — NURSING NOTE
Lubna Bruce's chief complaint for this visit includes:  Chief Complaint   Patient presents with    Follow Up     Allergic rhinitis, greatly improved since using Atrovent instead of Flonase.     PCP: Peter Wells    Referring Provider:  Peter Wells  [address] [place] [address]    There were no vitals taken for this visit.        Hakan Varela, EMT  July 24, 2024

## 2024-07-24 NOTE — PROGRESS NOTES
AUDIOLOGY REPORT    SUMMARY: Audiology visit completed. See audiogram for results.    RECOMMENDATIONS: Follow-up with ENT.    Cuauhtemoc Reed  Doctor of Audiology  [place] License # [id number]     no

## 2024-07-24 NOTE — LETTER
7/24/2024      Lubna Bruce  [address]  [facility] [address]      Dear Colleague,    Thank you for referring your patient, Lubna Bruce, to the [facility]. Please see a copy of my visit note below.    Chief Complaint   Patient presents with     Follow Up     Allergic rhinitis, greatly improved since using Atrovent instead of Flonase.      PCP: Peter Wells     Referring Provider: No ref. provider found    There were no vitals taken for this visit.    ENT Problem List:  Patient Active Problem List   Diagnosis   (none) - all problems resolved or deleted      Current Medications:  Current Outpatient Medications   Medication Sig Dispense Refill     acetaminophen (TYLENOL) 650 MG CR tablet Take 650 mg by mouth 2 times daily       albuterol (PROAIR HFA/PROVENTIL HFA/VENTOLIN HFA) 108 (90 BASE) MCG/ACT Inhaler Inhale 2 puffs into the lungs       BREZTRI AEROSPHERE 160-9-4.8 MCG/ACT AERO inhaler 1 puff       Calcium Carb-Cholecalciferol (CALCIUM 600 + D PO) Take 1 tablet by mouth daily       cetirizine (ZYRTEC) 10 MG tablet Take 10 mg by mouth daily Taking the brand Allertec       clopidogrel (PLAVIX) 75 MG tablet Take 1 tablet by mouth daily       gabapentin (NEURONTIN) 100 MG capsule        ipratropium (ATROVENT) 0.03 % nasal spray Spray 2 sprays into both nostrils 2 times daily 30 mL 3     loratadine (CLARITIN) 10 MG tablet Take 10 mg by mouth       nitroGLYcerin (NITROSTAT) 0.4 MG sublingual tablet TAKE 1 TABLET SUBLINGUALLY EVERY 5 MINUTES UP TO 3 DOSES AS NEEDED FOR CHEST PAIN for 30 days       omeprazole (PRILOSEC) 20 MG DR capsule TAKE 1 CAPSULE BY MOUTH EVERY DAY 30 MINUTES BEFORE MORNING MEAL FOR 90 DAYS       Potassium Gluconate 595 (99 K) MG TABS        pramipexole (MIRAPEX) 0.125 MG tablet Take 0.125 mg by mouth       rosuvastatin (CRESTOR) 40 MG tablet Take 40 mg by mouth daily       sotalol (BETAPACE) 80 MG tablet TAKE 0.5 TABLETS (40 MG) BY MOUTH  TWICE A DAY.       torsemide (DEMADEX) 20 MG tablet Take 10 mg by mouth       zolpidem (AMBIEN) 5 MG tablet Take 5 mg by mouth       aspirin 81 MG tablet Take 81 mg by mouth (Patient not taking: Reported on 7/24/2024)       No current facility-administered medications for this visit.       GEORGE Candelario 73 year old female presents today as an established patient for allergic rhinitis. Her previous visit was on 03/19/2024, where she was advised to continue using Atrovent. Today, she reports significant improvement since using Atrovent instead of Flonase. She says that she wishes that she could use it forever, because she really likes it. She uses it once in the morning. If she is outside working, she will administer a second dose as needed. She denies facial pressure and pain. She has to make sure that she breathes, describing shallow breathing form emphysema. She uses a CPAP machine daily. Whenever not using the spray, she has a runny nose. She has seen a allergist, and had a skin test. She is allergic to dust and has to take allergy medications for year-round allergies. She describes having a buzzing sound, and decreased hearing. She has occasional dizziness with head movements and laying down.     Review of Systems   Constitutional: Negative.    HENT:  Positive for hearing loss and tinnitus.    Eyes: Negative.    Respiratory: Negative.     Gastrointestinal: Negative.    Skin: Negative.    Endo/Heme/Allergies:  Positive for environmental allergies.   All other systems reviewed and are negative.      Physical Exam  Vitals and nursing note reviewed.   Constitutional:       Appearance: Normal appearance.   HENT:      Head: Normocephalic and atraumatic.      Right Ear: Tympanic membrane, ear canal and external ear normal. There is no impacted cerumen.      Left Ear: Tympanic membrane, ear canal and external ear normal. There is no impacted cerumen.      Nose: Septal deviation and congestion present.      Right Nostril:  No occlusion.      Left Nostril: No occlusion.      Mouth/Throat:      Mouth: Mucous membranes are moist.   Neurological:      Mental Status: She is alert.     + very slight septal deviation to the right  + soft tissues are shrinking    Audiogram: Audiogram was independently reviewed.  AUDIOGRAM: She underwent an audiogram today 07/24/2024. This demonstrates:  Results: WNL to borderline WNL right. WNL to mild SNHL left . 100% word rec bilaterally. Tymps WNL. Present 1 kHz ipsi/contra reflexes bilaterally.   Right: Speech reception threshold is 15 dB with 100% word recognition. Tympanogram -- type   Left: Speech reception threshold is 15 dB with 100% word recognition. Tympanogram -- type     A/P  This pleasant patient has allergic rhinitis She has a slight septal deviation to the right, and the inferior turbinates are shrinking. Because the spray is working, she does not want to proceed with any surgical intervention. We reviewed topical nasal sprays, and she has been encouraged to continue treatment as needed. In regards to her asymmetric hearing loss, she did not want to proceed with a hearing test, but said that she would if audiology was available immediately. We were able to get her into audiology today. Her audiogram shown asymmetry at the higher frequencies. The nerves are functioning properly, and findings are suggestive issues in the inner ear. She is not interested in further investigation such as an MRI, though she has agreed to get another hearing test in a year.      Follow up in clinic in 4 months.    Scribe/Staff:    Scribe Disclosure:   I, Katherin Castillo, am serving as a scribe; to document services personally performed by Hans Greenfield MD based on data collection and the provider's statements to me.     Provider Disclosure:  I agree with above History, Review of Systems, Physical exam and Plan.  I have reviewed the content of the documentation and have edited it as needed. I have personally  performed the services documented here and the documentation accurately represents those services and the decisions I have made.      Electronically signed by:  Hans Greenfield MD         Again, thank you for allowing me to participate in the care of your patient.        Sincerely,        Hans Greenfield MD

## 2024-08-21 DIAGNOSIS — J34.89 NASAL VESTIBULITIS: ICD-10-CM

## 2024-08-21 RX ORDER — TRIAMCINOLONE ACETONIDE 1 MG/G
OINTMENT TOPICAL DAILY PRN
Qty: 30 G | Refills: 0 | Status: SHIPPED | OUTPATIENT
Start: 2024-08-21

## 2024-12-16 DIAGNOSIS — J30.0 CHRONIC VASOMOTOR RHINITIS: ICD-10-CM

## 2024-12-16 RX ORDER — IPRATROPIUM BROMIDE 21 UG/1
2 SPRAY, METERED NASAL 2 TIMES DAILY
Qty: 30 ML | Refills: 3 | Status: SHIPPED | OUTPATIENT
Start: 2024-12-16

## 2024-12-16 NOTE — TELEPHONE ENCOUNTER
Atrovent        Last written prescription date: 6-26-24        Last fill quantity: 30ml, # refills: 3        Last office visit: 7-24-24        Future office visit: none        Is this a controlled substance?  No

## 2024-12-30 DIAGNOSIS — J34.89 NASAL VESTIBULITIS: ICD-10-CM

## 2024-12-30 RX ORDER — TRIAMCINOLONE ACETONIDE 1 MG/G
OINTMENT TOPICAL DAILY PRN
Qty: 30 G | Refills: 0 | Status: SHIPPED | OUTPATIENT
Start: 2024-12-30

## 2024-12-30 NOTE — PROGRESS NOTES
Refill request for triamcinolone ointment received from [facility].  Last appointment 7/24/24.  Medication refilled as requested.  Dulce Maria Burk RN

## 2025-07-19 ENCOUNTER — HEALTH MAINTENANCE LETTER (OUTPATIENT)
Age: 75
End: 2025-07-19

## 2025-08-05 DIAGNOSIS — J30.0 CHRONIC VASOMOTOR RHINITIS: ICD-10-CM

## 2025-08-05 RX ORDER — IPRATROPIUM BROMIDE 21 UG/1
2 SPRAY, METERED NASAL 2 TIMES DAILY
Qty: 30 ML | Refills: 0 | Status: SHIPPED | OUTPATIENT
Start: 2025-08-05

## (undated) RX ORDER — ALBUTEROL SULFATE 0.83 MG/ML
SOLUTION RESPIRATORY (INHALATION)
Status: DISPENSED
Start: 2018-01-26